# Patient Record
Sex: FEMALE | Race: WHITE | NOT HISPANIC OR LATINO | Employment: OTHER | URBAN - METROPOLITAN AREA
[De-identification: names, ages, dates, MRNs, and addresses within clinical notes are randomized per-mention and may not be internally consistent; named-entity substitution may affect disease eponyms.]

---

## 2017-07-07 ENCOUNTER — ALLSCRIPTS OFFICE VISIT (OUTPATIENT)
Dept: OTHER | Facility: OTHER | Age: 57
End: 2017-07-07

## 2017-07-07 LAB
BILIRUB UR QL STRIP: NORMAL
CLARITY UR: NORMAL
COLOR UR: YELLOW
GLUCOSE (HISTORICAL): NORMAL
HGB UR QL STRIP.AUTO: NORMAL
KETONES UR STRIP-MCNC: NORMAL MG/DL
LEUKOCYTE ESTERASE UR QL STRIP: NORMAL
NITRITE UR QL STRIP: NORMAL
PH UR STRIP.AUTO: 6.5 [PH]
PROT UR STRIP-MCNC: NORMAL MG/DL
SP GR UR STRIP.AUTO: 1
UROBILINOGEN UR QL STRIP.AUTO: 0.2

## 2017-11-09 ENCOUNTER — TRANSCRIBE ORDERS (OUTPATIENT)
Dept: ADMINISTRATIVE | Facility: HOSPITAL | Age: 57
End: 2017-11-09

## 2017-11-09 DIAGNOSIS — Z12.39 SCREENING BREAST EXAMINATION: Primary | ICD-10-CM

## 2017-12-01 ENCOUNTER — GENERIC CONVERSION - ENCOUNTER (OUTPATIENT)
Dept: OTHER | Facility: OTHER | Age: 57
End: 2017-12-01

## 2017-12-01 ENCOUNTER — HOSPITAL ENCOUNTER (OUTPATIENT)
Dept: RADIOLOGY | Age: 57
Discharge: HOME | End: 2017-12-01
Payer: COMMERCIAL

## 2017-12-01 DIAGNOSIS — Z12.39 SCREENING BREAST EXAMINATION: ICD-10-CM

## 2017-12-01 PROCEDURE — G0202 SCR MAMMO BI INCL CAD: HCPCS

## 2018-01-11 NOTE — RESULT NOTES
Verified Results  (1) LIPID PANEL, FASTING [id number] 57:50KC Chalo Kevin Order Number: [id number]  TW Order Number: [id number]     Test Name Result Flag Reference   CHOLESTEROL 215 mg/dL H    HDL,DIRECT 59 mg/dL  40-60   Specimen collection should occur prior to Metamizole administration due to the potential for falsely depressed results  LDL CHOLESTEROL CALCULATED 146 mg/dL H 0-100   Triglyceride:         Normal              <150 mg/dl       Borderline High    150-199 mg/dl       High               200-499 mg/dl       Very High          >499 mg/dl  Cholesterol:         Desirable        <200 mg/dl      Borderline High  200-239 mg/dl      High             >239 mg/dl  HDL Cholesterol:        High    >59 mg/dL      Low     <41 mg/dL  LDL CALCULATED:    This screening LDL is a calculated result  It does not have the accuracy of the Direct Measured LDL in the monitoring of patients with hyperlipidemia and/or statin therapy  Direct Measure LDL (CIP652) must be ordered separately in these patients  TRIGLYCERIDES 49 mg/dL  <=150   Specimen collection should occur prior to N-Acetylcysteine or Metamizole administration due to the potential for falsely depressed results  Discussion/Summary   Blood work shows cholesterol mildly elevated at 215  Mail low-cholesterol diet sheet to follow more closely

## 2018-01-18 NOTE — PROGRESS NOTES
Assessment    1  Encounter for preventive health examination (V70 0) (Z00 00)    Plan  Health Maintenance    · Urine Dip Non-Automated- POC; Status:Complete;   Done: [id number] 02:36PM    Discussion/Summary    Well adult  Patient appears to be in stable health  She was sent for blood work including hemoglobin A1c, lipid panel, CMP, CBC, and TSH with a laboratory voucher at [facility]  Chief Complaint  Pt is here for a physical  All meds/allergies reviewed with pt  History of Present Illness  HPI: Patient denies any recent illness  Her last gynecology exam was apparently 2 years ago  She is a nonsmoker  She does obtain a regular form of exercise  Her last colonoscopy was 5 years ago  Review of Systems    Constitutional: No fever, no chills, feels well, no tiredness, no recent weight gain or weight loss  Eyes: No complaints of eye pain, no red eyes, no eyesight problems, no discharge, no dry eyes, no itching of eyes  ENT: no complaints of earache, no loss of hearing, no nose bleeds, no nasal discharge, no sore throat, no hoarseness  Cardiovascular: No complaints of slow heart rate, no fast heart rate, no chest pain, no palpitations, no leg claudication, no lower extremity edema  Respiratory: No complaints of shortness of breath, no wheezing, no cough, no SOB on exertion, no orthopnea, no PND  Gastrointestinal: No complaints of abdominal pain, no constipation, no nausea or vomiting, no diarrhea, no bloody stools  Genitourinary: No complaints of dysuria, no incontinence, no pelvic pain, no dysmenorrhea, no vaginal discharge or bleeding  Musculoskeletal: No complaints of arthralgias, no myalgias, no joint swelling or stiffness, no limb pain or swelling  Integumentary: No complaints of skin rash or lesions, no itching, no skin wounds, no breast pain or lump     Neurological: No complaints of headache, no confusion, no convulsions, no numbness, no dizziness or fainting, no tingling, no limb weakness, no difficulty walking  Active Problems    1  Backache (724 5) (M54 9)   2  Hyperlipidemia (272 4) (E78 5)    Past Medical History    · History of Acute Gangrenous Pharyngitis (462)   · History of Encounter for screening mammogram for malignant neoplasm of breast  (V76 12) (Z12 31)   · History of Reported Pap Smear    Social History    · Marital History - Currently     Allergies    1  No Known Drug Allergies    Vitals   Recorded: [id number] 02:46PM Recorded: 03NVF5467 02:34PM   Temperature  97 1 F   Heart Rate  70   Systolic 124 032   Diastolic 60 72   Height  5 ft 8 in   Weight  147 lb    BMI Calculated  22 35   BSA Calculated  1 79     Physical Exam    Constitutional   General appearance: No acute distress, well appearing and well nourished  Ears, Nose, Mouth, and Throat   External inspection of ears and nose: Normal     Otoscopic examination: Tympanic membranes translucent with normal light reflex  Canals patent without erythema  Hearing: Normal     Oropharynx: Normal with no erythema, edema, exudate or lesions  Pulmonary   Respiratory effort: No increased work of breathing or signs of respiratory distress  Auscultation of lungs: Clear to auscultation  Cardiovascular   Auscultation of heart: Normal rate and rhythm, normal S1 and S2, no murmurs  Carotid pulses: 2+ bilaterally  Examination of extremities for edema and/or varicosities: Normal     Abdomen   Abdomen: Non-tender, no masses  Liver and spleen: No hepatomegaly or splenomegaly  Lymphatic   Palpation of lymph nodes in neck: No lymphadenopathy  Musculoskeletal   Gait and station: Normal     Skin   Skin and subcutaneous tissue: Normal without rashes or lesions  Psychiatric   Judgment and insight: Normal     Orientation to person, place, and time: Normal     Recent and remote memory: Intact      Mood and affect: Normal        Results/Data  PHQ-2 Adult Depression Screening [id number] 02:37PM User, [facility]     Test Name Result Flag Reference   PHQ-2 Adult Depression Score 0     Over the last two weeks, how often have you been bothered by any of the following problems?   Little interest or pleasure in doing things: Not at all - 0  Feeling down, depressed, or hopeless: Not at all - 0   PHQ-2 Adult Depression Screening Negative       Urine Dip Non-Automated- POC [id number] 73:15TA Dion [place]     Test Name Result Flag Reference   Color Yellow     Clarity Transparent     Leukocytes -     Nitrite -     Blood -     Bilirubin -     Urobilinogen 0 2     Protein -     Ph 6 5     Specific Gravity 1 005     Ketone -     Glucose -         Signatures   Electronically signed by : DONTAE Perdue ; Jul 7 7821  5:04PM EST                       (Author)

## 2018-01-22 VITALS
HEIGHT: 68 IN | DIASTOLIC BLOOD PRESSURE: 60 MMHG | SYSTOLIC BLOOD PRESSURE: 110 MMHG | HEART RATE: 70 BPM | TEMPERATURE: 97.1 F | BODY MASS INDEX: 22.28 KG/M2 | WEIGHT: 147 LBS

## 2018-12-06 ENCOUNTER — HOSPITAL ENCOUNTER (OUTPATIENT)
Dept: RADIOLOGY | Age: 58
Discharge: HOME | End: 2018-12-06
Payer: COMMERCIAL

## 2018-12-06 VITALS — WEIGHT: 145 LBS | BODY MASS INDEX: 21.98 KG/M2 | HEIGHT: 68 IN

## 2018-12-06 DIAGNOSIS — Z12.31 ENCOUNTER FOR SCREENING MAMMOGRAM FOR MALIGNANT NEOPLASM OF BREAST: ICD-10-CM

## 2018-12-06 PROCEDURE — 77067 SCR MAMMO BI INCL CAD: CPT

## 2020-03-03 ENCOUNTER — OFFICE VISIT (OUTPATIENT)
Dept: FAMILY MEDICINE CLINIC | Facility: CLINIC | Age: 60
End: 2020-03-03

## 2020-03-03 VITALS
HEART RATE: 74 BPM | HEIGHT: 68 IN | TEMPERATURE: 99.1 F | BODY MASS INDEX: 22.97 KG/M2 | RESPIRATION RATE: 15 BRPM | WEIGHT: 151.6 LBS | SYSTOLIC BLOOD PRESSURE: 94 MMHG | DIASTOLIC BLOOD PRESSURE: 70 MMHG | OXYGEN SATURATION: 94 %

## 2020-03-03 DIAGNOSIS — E78.5 HYPERLIPIDEMIA, UNSPECIFIED HYPERLIPIDEMIA TYPE: ICD-10-CM

## 2020-03-03 DIAGNOSIS — Z00.00 ANNUAL PHYSICAL EXAM: Primary | ICD-10-CM

## 2020-03-03 LAB
SL AMB  POCT GLUCOSE, UA: NORMAL
SL AMB LEUKOCYTE ESTERASE,UA: NORMAL
SL AMB POCT BILIRUBIN,UA: NORMAL
SL AMB POCT BLOOD,UA: NORMAL
SL AMB POCT CLARITY,UA: CLEAR
SL AMB POCT COLOR,UA: YELLOW
SL AMB POCT KETONES,UA: NORMAL
SL AMB POCT NITRITE,UA: NORMAL
SL AMB POCT PH,UA: 7
SL AMB POCT SPECIFIC GRAVITY,UA: 1.01
SL AMB POCT URINE PROTEIN: NORMAL
SL AMB POCT UROBILINOGEN: 0.2

## 2020-03-03 PROCEDURE — 99396 PREV VISIT EST AGE 40-64: CPT | Performed by: FAMILY MEDICINE

## 2020-03-03 PROCEDURE — 81002 URINALYSIS NONAUTO W/O SCOPE: CPT | Performed by: FAMILY MEDICINE

## 2020-03-03 RX ORDER — IBUPROFEN 600 MG/1
600 TABLET ORAL EVERY 6 HOURS PRN
COMMUNITY
End: 2022-02-25

## 2020-03-03 NOTE — ASSESSMENT & PLAN NOTE
Well adult  Patient appears to be overall stable health  She will obtain blood work as ordered for further evaluation  She is up-to-date with colon cancer screening as well as breast cancer and uterine cancer screening

## 2020-03-03 NOTE — PROGRESS NOTES
FAMILY PRACTICE OFFICE VISIT       NAME: Minyd Main  AGE: 62 y o  SEX: female       : 1960        MRN: [id number]    DATE: 3/3/2020  TIME: 4:38 PM    Assessment and Plan     Problem List Items Addressed This Visit        Other    Annual physical exam - Primary     Well adult  Patient appears to be overall stable health  She will obtain blood work as ordered for further evaluation  She is up-to-date with colon cancer screening as well as breast cancer and uterine cancer screening  Relevant Orders    POCT urine dip (Completed)    Hyperlipidemia     Hyperlipidemia  Patient will check lipid panel blood work and continue with current over-the-counter medications  We will make further recommendations pending results of test                   Chief Complaint     Chief Complaint   Patient presents with    Annual Exam       History of Present Illness     Patient denies any recent illness  Patient will be retiring from her [address] this year  She does see a dermatologist on a yearly basis for routine screening  She does see gynecologist and have mammograms as ordered  Her last colonoscopy was   She does take over-the-counter product for hot and cold flashes at night due to postmenopausal symptoms  She also takes calcium with vitamin-D, red yeast rice, and Krill oil for history of elevated cholesterol  She is a nonsmoker      Review of Systems   Review of Systems   Constitutional: Negative  HENT: Negative  Eyes: Negative  Respiratory: Negative  Cardiovascular: Negative  Gastrointestinal: Negative  Endocrine: Positive for cold intolerance and heat intolerance  Post menopausal hot flashes at bedtime   Genitourinary: Negative  Musculoskeletal: Negative  Skin: Negative  Allergic/Immunologic: Negative  Neurological: Negative  Hematological: Negative  Psychiatric/Behavioral: Negative          Active Problem List     Patient Active Problem List   Diagnosis    Annual physical exam    Hyperlipidemia       Past Medical History:  No past medical history on file  Past Surgical History:  No past surgical history on file      Family History:  Family History   Problem Relation Age of Onset    Ovarian cancer Sister 27       Social History:  Social History     Socioeconomic History    Marital status: /Civil Union     Spouse name: Not on file    Number of children: Not on file    Years of education: Not on file    Highest education level: Not on file   Occupational History    Not on file   Social Needs    Financial resource strain: Not on file    Food insecurity:     Worry: Not on file     Inability: Not on file    Transportation needs:     Medical: Not on file     Non-medical: Not on file   Tobacco Use    Smoking status: Never Smoker    Smokeless tobacco: Never Used   Substance and Sexual Activity    Alcohol use: No    Drug use: No    Sexual activity: Not on file   Lifestyle    Physical activity:     Days per week: Not on file     Minutes per session: Not on file    Stress: Not on file   Relationships    Social connections:     Talks on phone: Not on file     Gets together: Not on file     Attends [employer] service: Not on file     Active member of club or organization: Not on file     Attends meetings of clubs or organizations: Not on file     Relationship status: Not on file    Intimate partner violence:     Fear of current or ex partner: Not on file     Emotionally abused: Not on file     Physically abused: Not on file     Forced sexual activity: Not on file   Other Topics Concern    Not on file   Social History Narrative    Not on file       Objective     Vitals:    03/03/20 1433   BP: 94/70   Pulse: 74   Resp: 15   Temp: 99 1 °F (37 3 °C)   SpO2: 94%     Wt Readings from Last 3 Encounters:   03/03/20 68 8 kg (151 lb 9 6 oz)   12/06/18 65 8 kg (145 lb)   02/27/18 64 3 kg (141 lb 12 8 oz)       Physical Exam   Constitutional: She is oriented to person, place, and time  She appears well-developed and well-nourished  HENT:   Right Ear: External ear normal    Left Ear: External ear normal    Nose: Nose normal    Mouth/Throat: Oropharynx is clear and moist    Tympanic membranes normal  Pharynx clear   Eyes: Pupils are equal, round, and reactive to light  Conjunctivae and EOM are normal    Neck: Normal range of motion  Neck supple  No thyromegaly present  Cardiovascular: Normal rate, regular rhythm and normal heart sounds  No murmur heard  Pulmonary/Chest: Effort normal and breath sounds normal  No respiratory distress  She has no wheezes  She has no rales  Abdominal: Soft  Bowel sounds are normal  She exhibits no distension and no mass  There is no tenderness  There is no rebound and no guarding  NO hepatospenomegaly   Musculoskeletal: Normal range of motion  She exhibits no edema, tenderness or deformity  Lymphadenopathy:     She has no cervical adenopathy  Neurological: She is alert and oriented to person, place, and time  Skin:   NO RASHES   Psychiatric: She has a normal mood and affect  Her behavior is normal  Judgment and thought content normal    Nursing note and vitals reviewed        Pertinent Laboratory/Diagnostic Studies:  No results found for: GLUCOSE, BUN, CREATININE, CALCIUM, NA, K, CO2, CL  No results found for: ALT, AST, GGT, ALKPHOS, BILITOT    No results found for: WBC, HGB, HCT, MCV, PLT    No results found for: TSH    No results found for: CHOL  Lab Results   Component Value Date    TRIG 49 05/06/2016     Lab Results   Component Value Date    HDL 59 05/06/2016     Lab Results   Component Value Date    LDLCALC 146 (H) 05/06/2016     No results found for: HGBA1C    Results for orders placed or performed in visit on 03/03/20   POCT urine dip   Result Value Ref Range    LEUKOCYTE ESTERASE,UA moderate     NITRITE,UA -     SL AMB POCT UROBILINOGEN 0 2     POCT URINE PROTEIN -      PH,UA 7 0     BLOOD,UA - SPECIFIC GRAVITY,UA 1 010     KETONES,UA -     BILIRUBIN,UA -     GLUCOSE, UA -      COLOR,UA yellow     CLARITY,UA clear        Orders Placed This Encounter   Procedures    POCT urine dip       ALLERGIES:  No Known Allergies    Current Medications     Current Outpatient Medications   Medication Sig Dispense Refill    Calcium Carbonate-Vitamin D3 600-400 MG-UNIT TABS Take      ibuprofen (MOTRIN) 600 mg tablet Take 600 mg by mouth every 6 (six) hours as needed      KRILL OIL PO Take 1,000 mg by mouth      Red Yeast Rice Extract (RED YEAST RICE PO) Take by mouth       No current facility-administered medications for this visit  Health Maintenance     Health Maintenance   Topic Date Due    Hepatitis C Screening  1960    DTaP,Tdap,and Td Vaccines (1 - Tdap) 09/26/1969    HIV Screening  09/26/1973    Annual Physical  09/26/1976    Influenza Vaccine  03/30/2020 (Originally 7/1/2019)    MAMMOGRAM  12/06/2020    Depression Screening PHQ  03/03/2021    BMI: Adult  03/03/2021    Cervical Cancer Screening  12/05/2022    CRC Screening: Colonoscopy  12/21/2022    Pneumococcal Vaccine: 65+ Years (1 of 2 - PCV13) 09/26/2023    Pneumococcal Vaccine: Pediatrics (0 to 5 Years) and At-Risk Patients (6 to 59 Years)  Aged Out    HIB Vaccine  Aged Out    Hepatitis B Vaccine  Aged Out    IPV Vaccine  Aged Out    Hepatitis A Vaccine  Aged Out    Meningococcal ACWY Vaccine  Aged Out    HPV Vaccine  Aged Out       There is no immunization history on file for this patient      Getachew Ulrich MD

## 2020-03-03 NOTE — ASSESSMENT & PLAN NOTE
Hyperlipidemia  Patient will check lipid panel blood work and continue with current over-the-counter medications    We will make further recommendations pending results of test

## 2020-08-21 ENCOUNTER — APPOINTMENT (EMERGENCY)
Dept: RADIOLOGY | Facility: HOSPITAL | Age: 60
End: 2020-08-21

## 2020-08-21 ENCOUNTER — HOSPITAL ENCOUNTER (EMERGENCY)
Facility: HOSPITAL | Age: 60
Discharge: HOME | End: 2020-08-21
Attending: EMERGENCY MEDICINE | Admitting: EMERGENCY MEDICINE

## 2020-08-21 VITALS
HEIGHT: 68 IN | DIASTOLIC BLOOD PRESSURE: 65 MMHG | OXYGEN SATURATION: 96 % | TEMPERATURE: 98.1 F | BODY MASS INDEX: 22.88 KG/M2 | HEART RATE: 77 BPM | WEIGHT: 151 LBS | SYSTOLIC BLOOD PRESSURE: 152 MMHG | RESPIRATION RATE: 18 BRPM

## 2020-08-21 DIAGNOSIS — R51.9 HEADACHE: Primary | ICD-10-CM

## 2020-08-21 DIAGNOSIS — R11.2 NAUSEA & VOMITING: ICD-10-CM

## 2020-08-21 PROCEDURE — 96375 TX/PRO/DX INJ NEW DRUG ADDON: CPT

## 2020-08-21 PROCEDURE — 96361 HYDRATE IV INFUSION ADD-ON: CPT

## 2020-08-21 PROCEDURE — 96374 THER/PROPH/DIAG INJ IV PUSH: CPT

## 2020-08-21 PROCEDURE — 99283 EMERGENCY DEPT VISIT LOW MDM: CPT

## 2020-08-21 PROCEDURE — 70450 CT HEAD/BRAIN W/O DYE: CPT

## 2020-08-21 PROCEDURE — G1004 CDSM NDSC: HCPCS

## 2020-08-21 PROCEDURE — 99284 EMERGENCY DEPT VISIT MOD MDM: CPT | Performed by: EMERGENCY MEDICINE

## 2020-08-21 RX ORDER — METOCLOPRAMIDE HYDROCHLORIDE 5 MG/ML
10 INJECTION INTRAMUSCULAR; INTRAVENOUS ONCE
Status: COMPLETED | OUTPATIENT
Start: 2020-08-21 | End: 2020-08-21

## 2020-08-21 RX ORDER — ONDANSETRON 2 MG/ML
4 INJECTION INTRAMUSCULAR; INTRAVENOUS ONCE
Status: COMPLETED | OUTPATIENT
Start: 2020-08-21 | End: 2020-08-21

## 2020-08-21 RX ORDER — ONDANSETRON 4 MG/1
4 TABLET, ORALLY DISINTEGRATING ORAL EVERY 6 HOURS PRN
Qty: 10 TABLET | Refills: 0 | Status: SHIPPED | OUTPATIENT
Start: 2020-08-21 | End: 2021-12-21

## 2020-08-21 RX ORDER — KETOROLAC TROMETHAMINE 30 MG/ML
15 INJECTION, SOLUTION INTRAMUSCULAR; INTRAVENOUS ONCE
Status: COMPLETED | OUTPATIENT
Start: 2020-08-21 | End: 2020-08-21

## 2020-08-21 RX ORDER — DIPHENHYDRAMINE HYDROCHLORIDE 50 MG/ML
25 INJECTION INTRAMUSCULAR; INTRAVENOUS ONCE
Status: COMPLETED | OUTPATIENT
Start: 2020-08-21 | End: 2020-08-21

## 2020-08-21 RX ADMIN — METOCLOPRAMIDE 10 MG: 5 INJECTION, SOLUTION INTRAMUSCULAR; INTRAVENOUS at 21:50

## 2020-08-21 RX ADMIN — DIPHENHYDRAMINE HYDROCHLORIDE 25 MG: 50 INJECTION, SOLUTION INTRAMUSCULAR; INTRAVENOUS at 21:50

## 2020-08-21 RX ADMIN — KETOROLAC TROMETHAMINE 15 MG: 30 INJECTION, SOLUTION INTRAMUSCULAR at 21:50

## 2020-08-21 RX ADMIN — SODIUM CHLORIDE 1000 ML: 0.9 INJECTION, SOLUTION INTRAVENOUS at 21:48

## 2020-08-21 RX ADMIN — ONDANSETRON 4 MG: 2 INJECTION INTRAMUSCULAR; INTRAVENOUS at 22:36

## 2020-08-22 NOTE — ED PROVIDER NOTES
History  Chief Complaint   Patient presents with    Headache     Patient states she has had a headache on and off for the past couple weeks  Denies any sensitivity to light/sound  Denies any visual changes  +N/V     Pt is a 61yo F who presents for 1 day of headache with associated nausea and vomiting  Patient reports that she began headache last night that caused difficulty with sleeping  Patient states that throughout the day today that it has gotten worse despite ibuprofen and rest   Patient describes the headache as a throbbing in the frontal region with some associated neck pain  Patient states that she put peppermint oil on her neck that has relieved her neck pain  Patient rates the headache as a 3-4/10 with mild photophobia and phonophobia  Patient states that she does not have a history of migraines but did have a similar headache approximately 2 weeks ago that self resolved  Patient attributes headache at that time to dehydration is a had been out in the sun  Patient states that she has had 2 bouts of nonbloody vomiting today,  once after eating food and once after taking medications  Patient denying nausea at this time  Patient denying any motor or sensory deficits as well as any visual disturbance  Patient denies any trauma recently or any head strike  Patient denies ear pain but stated that she has had trouble getting water out of her right ear  Patient has no medication changes recently  Prior to Admission Medications   Prescriptions Last Dose Informant Patient Reported? Taking? Calcium Carbonate-Vitamin D3 600-400 MG-UNIT TABS   Yes No   Sig: Take   KRILL OIL PO   Yes No   Sig: Take 1,000 mg by mouth   Red Yeast Rice Extract (RED YEAST RICE PO)   Yes No   Sig: Take by mouth   ibuprofen (MOTRIN) 600 mg tablet   Yes No   Sig: Take 600 mg by mouth every 6 (six) hours as needed      Facility-Administered Medications: None       History reviewed   No pertinent past medical history  History reviewed  No pertinent surgical history  Family History   Problem Relation Age of Onset    Ovarian cancer Sister 27     I have reviewed and agree with the history as documented  E-Cigarette/Vaping     E-Cigarette/Vaping Substances     Social History     Tobacco Use    Smoking status: Never Smoker    Smokeless tobacco: Never Used   Substance Use Topics    Alcohol use: No    Drug use: No        Review of Systems   Constitutional: Negative for activity change, chills, diaphoresis and fever  HENT: Negative for congestion, ear discharge, ear pain, sinus pressure, sinus pain and sore throat  Eyes: Positive for photophobia  Negative for pain, redness and visual disturbance  Respiratory: Negative for cough, chest tightness, shortness of breath and wheezing  Cardiovascular: Negative for chest pain, palpitations and leg swelling  Gastrointestinal: Positive for nausea and vomiting (non-bloody)  Negative for abdominal distention, abdominal pain and diarrhea  Genitourinary: Negative for dysuria, frequency and urgency  Musculoskeletal: Positive for neck pain (relieved with peppermint oil)  Negative for arthralgias, back pain, gait problem and neck stiffness  Skin: Negative for color change, pallor, rash and wound  Neurological: Positive for headaches (frontal)  Negative for dizziness, tremors, seizures, syncope, weakness, light-headedness and numbness  Psychiatric/Behavioral: Negative for agitation, behavioral problems and confusion  The patient is not nervous/anxious          Physical Exam  ED Triage Vitals [08/21/20 2111]   Temperature Pulse Respirations Blood Pressure SpO2   98 1 °F (36 7 °C) 77 18 152/65 96 %      Temp Source Heart Rate Source Patient Position - Orthostatic VS BP Location FiO2 (%)   Oral Monitor Lying Right arm --      Pain Score       4             Orthostatic Vital Signs  Vitals:    08/21/20 2111   BP: 152/65   Pulse: 77   Patient Position - Orthostatic VS: Lying       Physical Exam  Vitals signs reviewed  Constitutional:       General: She is not in acute distress  Appearance: Normal appearance  She is well-developed  She is not ill-appearing, toxic-appearing or diaphoretic  Comments: Pt is sitting up and does not appear uncomfortable  HENT:      Head: Normocephalic and atraumatic  Right Ear: Tympanic membrane, ear canal and external ear normal       Left Ear: Tympanic membrane, ear canal and external ear normal       Nose: Nose normal       Mouth/Throat:      Mouth: Mucous membranes are moist    Eyes:      Extraocular Movements: Extraocular movements intact  Conjunctiva/sclera: Conjunctivae normal       Pupils: Pupils are equal, round, and reactive to light  Neck:      Musculoskeletal: Normal range of motion and neck supple  Muscular tenderness (paraspinal; worse on L) present  No neck rigidity  Cardiovascular:      Rate and Rhythm: Normal rate and regular rhythm  Pulses: Normal pulses  Radial pulses are 2+ on the right side and 2+ on the left side  Dorsalis pedis pulses are 2+ on the right side and 2+ on the left side  Heart sounds: Normal heart sounds  No murmur  Pulmonary:      Effort: Pulmonary effort is normal  No respiratory distress  Breath sounds: Normal breath sounds  No wheezing  Chest:      Chest wall: No tenderness  Abdominal:      General: Bowel sounds are normal  There is no distension  Palpations: Abdomen is soft  There is no mass  Tenderness: There is no abdominal tenderness  Musculoskeletal: Normal range of motion  General: No tenderness  Right lower leg: No edema  Left lower leg: No edema  Lymphadenopathy:      Cervical: No cervical adenopathy  Skin:     General: Skin is warm and dry  Capillary Refill: Capillary refill takes less than 2 seconds  Coloration: Skin is not pale  Findings: No erythema or rash     Neurological: General: No focal deficit present  Mental Status: She is alert and oriented to person, place, and time  Mental status is at baseline  GCS: GCS eye subscore is 4  GCS verbal subscore is 5  GCS motor subscore is 6  Cranial Nerves: Cranial nerves are intact  No cranial nerve deficit  Sensory: Sensation is intact  No sensory deficit  Motor: Motor function is intact  No weakness  Coordination: Coordination normal    Psychiatric:         Mood and Affect: Mood normal          Behavior: Behavior normal          Thought Content: Thought content normal          Judgment: Judgment normal          ED Medications  Medications   ketorolac (TORADOL) injection 15 mg (15 mg Intravenous Given 8/21/20 2150)   metoclopramide (REGLAN) injection 10 mg (10 mg Intravenous Given 8/21/20 2150)   diphenhydrAMINE (BENADRYL) injection 25 mg (25 mg Intravenous Given 8/21/20 2150)   sodium chloride 0 9 % bolus 1,000 mL (0 mL Intravenous Stopped 8/21/20 2334)   ondansetron (ZOFRAN) injection 4 mg (4 mg Intravenous Given 8/21/20 2236)       Diagnostic Studies  Results Reviewed     None                 CT head without contrast   Final Result by Amber Curry MD (08/21 2155)      No acute intracranial abnormality  Workstation performed: [id number]               Procedures  Procedures      ED Course  ED Course as of Aug 21 2341   Fri Aug 21, 2020   2201 No intracranial abnormality  Will reassess pt after fluids and medication  Plan to discharge if symptomatically improved  CT head without contrast   2204 Pt began vomiting  Zofran ordered at this time  2219 Reassessed pt  States mild improvement of headache and now rates it 2-3/10  No nausea currently but describes feel "woozy"  Discussed CT result with pt  Will let pt finish fluids and will reassess  2313 Reassessed pt and states that headache has resolved and she is not nauseous  Pt reports only complaint as 'feeling tired'   Plan to discharge at this time and pt is agreeable to plan  US AUDIT      Most Recent Value   Initial Alcohol Screen: US AUDIT-C    1  How often do you have a drink containing alcohol?  0 Filed at: 08/21/2020 2108   2  How many drinks containing alcohol do you have on a typical day you are drinking? 0 Filed at: 08/21/2020 2108   3b  FEMALE Any Age, or MALE 65+: How often do you have 4 or more drinks on one occassion? 0 Filed at: 08/21/2020 2108   Audit-C Score  0 Filed at: 08/21/2020 2108                  ELIECER/DAST-10      Most Recent Value   How many times in the past year have you    Used an illegal drug or used a prescription medication for non-medical reasons? Never Filed at: 08/21/2020 2108                              MDM  Number of Diagnoses or Management Options  Headache:   Nausea & vomiting:   Diagnosis management comments: Pt is a 59yo F who presents with headache and associated nausea and vomiting  Exam pertinent for mild paraspinal tenderness of the neck  Differential diagnosis to include but not limited to tension headache, migraine, SAH, meningitis, tumor  Migraine cocktail will be administered including Reglan, Benadryl, and Toradol as well as 1 L fluids based on patient's history of vomiting  Based on patient's full range of motion of the neck along with tenderness that was relieved with peppermint well, meningitis seems less likely at this time and requires no further workup  CT scan showed no evidence of acute intracranial abnormality including bleed or tumor  Patient had vomiting after initial medications and Zofran was added to regimen  Patient states that nausea and headache both resolved following medications and fluids  Plan to discharge patient with follow-up to PCP  Patient already has appointment scheduled for Tuesday and was encouraged to keep that appointment  Patient instructed to return to the emergency department with new or worsening symptoms    Patient given a script for Zofran and instructed to take as needed for nausea and vomiting  Patient expressed understanding of discharge instructions, medication instructions, and return precautions  All questions were answered  Patient was discharged without incident  Amount and/or Complexity of Data Reviewed  Tests in the radiology section of CPT®: ordered and reviewed  Discussion of test results with the performing providers: yes          Disposition  Final diagnoses:   Headache   Nausea & vomiting     Time reflects when diagnosis was documented in both MDM as applicable and the Disposition within this note     Time User Action Codes Description Comment    8/21/2020 11:19 PM Caity Cages Add [R51] Headache     8/21/2020 11:19 PM Caity Cages Add [R11 2] Nausea & vomiting       ED Disposition     ED Disposition Condition Date/Time Comment    Discharge Stable Fri Aug 21, 2020 11:19 PM Julien Jordan discharge to home/self care  Follow-up Information     Follow up With Specialties Details Why Mayra Cabrera MD Family Medicine  Keep your appointment on Tuesday [address] [place] [address]  [phone]            Discharge Medication List as of 8/21/2020 11:21 PM      START taking these medications    Details   ondansetron (ZOFRAN-ODT) 4 mg disintegrating tablet Take 1 tablet (4 mg total) by mouth every 6 (six) hours as needed for nausea or vomiting, Starting Fri 8/21/2020, Normal         CONTINUE these medications which have NOT CHANGED    Details   Calcium Carbonate-Vitamin D3 600-400 MG-UNIT TABS Take, Historical Med      ibuprofen (MOTRIN) 600 mg tablet Take 600 mg by mouth every 6 (six) hours as needed, Historical Med      KRILL OIL PO Take 1,000 mg by mouth, Starting Mon 4/30/2012, Historical Med      Red Yeast Rice Extract (RED YEAST RICE PO) Take by mouth, Historical Med           No discharge procedures on file      PDMP Review     None           ED Provider  Attending physically available and evaluated Lauren Green I managed the patient along with the ED Attending      Electronically Signed by         Elmo Maldonado MD  08/21/20 9983

## 2020-08-22 NOTE — ED ATTENDING ATTESTATION
8/21/2020  INataliia MD, saw and evaluated the patient  I have discussed the patient with the resident/non-physician practitioner and agree with the resident's/non-physician practitioner's findings, Plan of Care, and MDM as documented in the resident's/non-physician practitioner's note, except where noted  All available labs and Radiology studies were reviewed  I was present for key portions of any procedure(s) performed by the resident/non-physician practitioner and I was immediately available to provide assistance  At this point I agree with the current assessment done in the Emergency Department  I have conducted an independent evaluation of this patient a history and physical is as follows:    ED Course         Critical Care Time  Procedures    63 yo female with dull throbbing headache started last night and today two episodes of vomiting  Pt with no relief with ibuprofen  No numbness, tingling, weakness  No trauma, no fall  Pt with a similar headache two weeks ago but no hx of migraines  Vss, afebrile, lungs cta, rrr, abdomen soft nontender, no neuro deficits  Tm clear, no sinus tenderness  Low suspicion for sah  Ct to rule out mass, pain meds for likely tension headache

## 2020-08-22 NOTE — DISCHARGE INSTRUCTIONS
Keep your pre-existing appointment with PCP for follow-up  Return to the ED with new or worsening symptoms  Take your new medication as needed for nausea and vomiting

## 2020-08-22 NOTE — ED NOTES
Dr Margi Mishra at the bedside for patient evaluation at this time     Sam Ricardo, RN  08/21/20 9243

## 2021-06-28 ENCOUNTER — TELEPHONE (OUTPATIENT)
Dept: FAMILY MEDICINE CLINIC | Facility: CLINIC | Age: 61
End: 2021-06-28

## 2021-06-28 NOTE — TELEPHONE ENCOUNTER
Spoke with patient and she has appointment on 7/26/21 and needs a script for labs    Please call to advise

## 2021-07-26 ENCOUNTER — OFFICE VISIT (OUTPATIENT)
Dept: FAMILY MEDICINE CLINIC | Facility: CLINIC | Age: 61
End: 2021-07-26

## 2021-07-26 VITALS
WEIGHT: 148.4 LBS | RESPIRATION RATE: 15 BRPM | HEIGHT: 68 IN | TEMPERATURE: 97.6 F | HEART RATE: 76 BPM | SYSTOLIC BLOOD PRESSURE: 114 MMHG | OXYGEN SATURATION: 98 % | BODY MASS INDEX: 22.49 KG/M2 | DIASTOLIC BLOOD PRESSURE: 60 MMHG

## 2021-07-26 DIAGNOSIS — Z00.00 ANNUAL PHYSICAL EXAM: Primary | ICD-10-CM

## 2021-07-26 DIAGNOSIS — E78.5 HYPERLIPIDEMIA, UNSPECIFIED HYPERLIPIDEMIA TYPE: ICD-10-CM

## 2021-07-26 PROCEDURE — 99396 PREV VISIT EST AGE 40-64: CPT | Performed by: FAMILY MEDICINE

## 2021-07-26 NOTE — PROGRESS NOTES
FAMILY PRACTICE OFFICE VISIT       NAME: Nhung Gurrola  AGE: 56 y o  SEX: female       : 1960        MRN: [id number]    DATE: 2021  TIME: 6:38 AM    Assessment and Plan     Problem List Items Addressed This Visit        Other    Annual physical exam - Primary      Well adult  Overall the patient appears to be in stable health  She will obtain blood work as ordered for further evaluation  We will make further recommendations pending results of test          Hyperlipidemia       Hyperlipidemia  Patient with history of elevated cholesterol despite patient having a good diet and regular form of exercise  If lipid panel does not improve patient was open to the idea of low-dose statin therapy                   Chief Complaint     Chief Complaint   Patient presents with    Annual Exam     [facility] mammo  phq        History of Present Illness      Patient the office for annual wellness exam    Patient denies any recent illness  She stays active with activities around her home  She denies any changes in weight  Her mammogram is up-to-date  Her last colonoscopy was   Her COVID vaccination is up-to-date      Review of Systems   Review of Systems   Constitutional: Negative  HENT: Negative  Eyes: Negative  Respiratory: Negative  Cardiovascular: Negative  Gastrointestinal: Negative  Genitourinary: Negative  Musculoskeletal: Negative  Skin: Negative  Neurological: Negative  Psychiatric/Behavioral: Negative  Active Problem List     Patient Active Problem List   Diagnosis    Annual physical exam    Hyperlipidemia       Past Medical History:  History reviewed  No pertinent past medical history  Past Surgical History:  History reviewed  No pertinent surgical history      Family History:  Family History   Problem Relation Age of Onset    Ovarian cancer Sister 27       Social History:  Social History     Socioeconomic History    Marital status: /Civil Union Spouse name: Not on file    Number of children: Not on file    Years of education: Not on file    Highest education level: Not on file   Occupational History    Not on file   Tobacco Use    Smoking status: Never Smoker    Smokeless tobacco: Never Used   Substance and Sexual Activity    Alcohol use: No    Drug use: No    Sexual activity: Not on file   Other Topics Concern    Not on file   Social History Narrative    Not on file     Social Determinants of Health     Financial Resource Strain:     Difficulty of Paying Living Expenses:    Food Insecurity:     Worried About Running Out of Food in the Last Year:     [address] in the Last Year:    Transportation Needs:     Lack of Transportation (Medical):  Lack of Transportation (Non-Medical):    Physical Activity:     Days of Exercise per Week:     Minutes of Exercise per Session:    Stress:     Feeling of Stress :    Social Connections:     Frequency of Communication with Friends and Family:     Frequency of Social Gatherings with Friends and Family:     Attends [employer] Services:     Active Member of Clubs or Organizations:     Attends Club or Organization Meetings:     Marital Status:    Intimate Partner Violence:     Fear of Current or Ex-Partner:     Emotionally Abused:     Physically Abused:     Sexually Abused:        Objective     Vitals:    07/26/21 1410   BP: 114/60   Pulse: 76   Resp: 15   Temp: 97 6 °F (36 4 °C)   SpO2: 98%     Wt Readings from Last 3 Encounters:   07/26/21 67 3 kg (148 lb 6 4 oz)   08/21/20 68 5 kg (151 lb)   03/03/20 68 8 kg (151 lb 9 6 oz)       Physical Exam  Constitutional:       General: She is not in acute distress  Appearance: Normal appearance  She is not ill-appearing  HENT:      Head: Normocephalic and atraumatic  Eyes:      General:         Right eye: No discharge  Left eye: No discharge  Extraocular Movements: Extraocular movements intact        Conjunctiva/sclera: Conjunctivae normal       Pupils: Pupils are equal, round, and reactive to light  Neck:      Vascular: No carotid bruit  Cardiovascular:      Rate and Rhythm: Normal rate and regular rhythm  Heart sounds: Normal heart sounds  No murmur heard  Pulmonary:      Effort: Pulmonary effort is normal       Breath sounds: Normal breath sounds  No wheezing, rhonchi or rales  Abdominal:      General: Abdomen is flat  Bowel sounds are normal  There is no distension  Palpations: Abdomen is soft  Tenderness: There is no abdominal tenderness  There is no guarding or rebound  Musculoskeletal:      Right lower leg: No edema  Left lower leg: No edema  Lymphadenopathy:      Cervical: No cervical adenopathy  Skin:     Findings: No rash  Neurological:      General: No focal deficit present  Mental Status: She is alert and oriented to person, place, and time  Cranial Nerves: No cranial nerve deficit  Psychiatric:         Mood and Affect: Mood normal          Behavior: Behavior normal          Thought Content:  Thought content normal          Judgment: Judgment normal          Pertinent Laboratory/Diagnostic Studies:  No results found for: GLUCOSE, BUN, CREATININE, CALCIUM, NA, K, CO2, CL  No results found for: ALT, AST, GGT, ALKPHOS, BILITOT    No results found for: WBC, HGB, HCT, MCV, PLT    No results found for: TSH    No results found for: CHOL  Lab Results   Component Value Date    TRIG 49 05/06/2016     Lab Results   Component Value Date    HDL 59 05/06/2016     Lab Results   Component Value Date    LDLCALC 146 (H) 05/06/2016     No results found for: HGBA1C    Results for orders placed or performed in visit on 03/03/20   POCT urine dip   Result Value Ref Range    LEUKOCYTE ESTERASE,UA moderate     NITRITE,UA -     SL AMB POCT UROBILINOGEN 0 2     POCT URINE PROTEIN -      PH,UA 7 0     BLOOD,UA -     SPECIFIC GRAVITY,UA 1 010     KETONES,UA -     BILIRUBIN,UA -     GLUCOSE, UA - COLOR,UA yellow     CLARITY,UA clear        No orders of the defined types were placed in this encounter  ALLERGIES:  No Known Allergies    Current Medications     Current Outpatient Medications   Medication Sig Dispense Refill    Calcium Carbonate-Vitamin D3 600-400 MG-UNIT TABS Take      ibuprofen (MOTRIN) 600 mg tablet Take 600 mg by mouth every 6 (six) hours as needed      KRILL OIL PO Take 1,000 mg by mouth      ondansetron (ZOFRAN-ODT) 4 mg disintegrating tablet Take 1 tablet (4 mg total) by mouth every 6 (six) hours as needed for nausea or vomiting 10 tablet 0    Red Yeast Rice Extract (RED YEAST RICE PO) Take by mouth       No current facility-administered medications for this visit  Health Maintenance     Health Maintenance   Topic Date Due    Hepatitis C Screening  Never done    HIV Screening  Never done    COVID-19 Vaccine (1) Never done    DTaP,Tdap,and Td Vaccines (1 - Tdap) Never done    Breast Cancer Screening: Mammogram  12/06/2020    Annual Physical  03/03/2021    Influenza Vaccine (1) 09/01/2021    Depression Screening PHQ  07/26/2022    BMI: Adult  07/26/2022    Cervical Cancer Screening  12/05/2022    Colorectal Cancer Screening  12/21/2022    Pneumococcal Vaccine: Pediatrics (0 to 5 Years) and At-Risk Patients (6 to 59 Years)  Aged Out    HIB Vaccine  Aged Out    Hepatitis B Vaccine  Aged Out    IPV Vaccine  Aged Out    Hepatitis A Vaccine  Aged Out    Meningococcal ACWY Vaccine  Aged Out    HPV Vaccine  Aged Out       There is no immunization history on file for this patient      Magali Rinne, MD

## 2021-07-27 NOTE — ASSESSMENT & PLAN NOTE
Well adult  Overall the patient appears to be in stable health  She will obtain blood work as ordered for further evaluation    We will make further recommendations pending results of test

## 2021-07-27 NOTE — ASSESSMENT & PLAN NOTE
Hyperlipidemia  Patient with history of elevated cholesterol despite patient having a good diet and regular form of exercise    If lipid panel does not improve patient was open to the idea of low-dose statin therapy

## 2021-11-02 ENCOUNTER — TELEPHONE (OUTPATIENT)
Dept: FAMILY MEDICINE CLINIC | Facility: CLINIC | Age: 61
End: 2021-11-02

## 2021-12-09 ENCOUNTER — APPOINTMENT (EMERGENCY)
Dept: CT IMAGING | Facility: HOSPITAL | Age: 61
End: 2021-12-09

## 2021-12-09 ENCOUNTER — HOSPITAL ENCOUNTER (EMERGENCY)
Facility: HOSPITAL | Age: 61
Discharge: HOME | End: 2021-12-09
Attending: EMERGENCY MEDICINE | Admitting: EMERGENCY MEDICINE

## 2021-12-09 VITALS
HEART RATE: 71 BPM | DIASTOLIC BLOOD PRESSURE: 70 MMHG | RESPIRATION RATE: 18 BRPM | SYSTOLIC BLOOD PRESSURE: 138 MMHG | TEMPERATURE: 98 F | OXYGEN SATURATION: 98 %

## 2021-12-09 DIAGNOSIS — N94.9 ADNEXAL CYST: ICD-10-CM

## 2021-12-09 DIAGNOSIS — S01.511A LIP LACERATION, INITIAL ENCOUNTER: ICD-10-CM
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
## 2021-12-09 DIAGNOSIS — R55 SYNCOPE: ICD-10-CM

## 2021-12-09 DIAGNOSIS — E04.1 THYROID NODULE: ICD-10-CM

## 2021-12-09 LAB
ALBUMIN SERPL BCP-MCNC: 3.5 G/DL (ref 3.5–5)
ALP SERPL-CCNC: 56 U/L (ref 46–116)
ALT SERPL W P-5'-P-CCNC: 30 U/L (ref 12–78)
ANION GAP SERPL CALCULATED.3IONS-SCNC: 8 MMOL/L (ref 4–13)
AST SERPL W P-5'-P-CCNC: 17 U/L (ref 5–45)
BACTERIA UR QL AUTO: NORMAL /HPF
BASOPHILS # BLD AUTO: 0.06 THOUSANDS/ΜL (ref 0–0.1)
BASOPHILS NFR BLD AUTO: 1 % (ref 0–1)
BILIRUB SERPL-MCNC: 0.29 MG/DL (ref 0.2–1)
BILIRUB UR QL STRIP: NEGATIVE
BUN SERPL-MCNC: 17 MG/DL (ref 5–25)
CALCIUM SERPL-MCNC: 8.5 MG/DL (ref 8.3–10.1)
CARDIAC TROPONIN I PNL SERPL HS: <2 NG/L
CHLORIDE SERPL-SCNC: 105 MMOL/L (ref 100–108)
CK MB SERPL-MCNC: 1.6 NG/ML (ref 0–5)
CK MB SERPL-MCNC: <1 % (ref 0–2.5)
CK SERPL-CCNC: 179 U/L (ref 26–192)
CLARITY UR: CLEAR
CO2 SERPL-SCNC: 29 MMOL/L (ref 21–32)
COLOR UR: ABNORMAL
CREAT SERPL-MCNC: 0.88 MG/DL (ref 0.6–1.3)
EOSINOPHIL # BLD AUTO: 0.21 THOUSAND/ΜL (ref 0–0.61)
EOSINOPHIL NFR BLD AUTO: 2 % (ref 0–6)
ERYTHROCYTE [DISTWIDTH] IN BLOOD BY AUTOMATED COUNT: 12 % (ref 11.6–15.1)
GFR SERPL CREATININE-BSD FRML MDRD: 70 ML/MIN/1.73SQ M
GLUCOSE SERPL-MCNC: 109 MG/DL (ref 65–140)
GLUCOSE UR STRIP-MCNC: NEGATIVE MG/DL
HCT VFR BLD AUTO: 41.9 % (ref 34.8–46.1)
HGB BLD-MCNC: 13.8 G/DL (ref 11.5–15.4)
HGB UR QL STRIP.AUTO: NEGATIVE
IMM GRANULOCYTES # BLD AUTO: 0.02 THOUSAND/UL (ref 0–0.2)
IMM GRANULOCYTES NFR BLD AUTO: 0 % (ref 0–2)
KETONES UR STRIP-MCNC: NEGATIVE MG/DL
LEUKOCYTE ESTERASE UR QL STRIP: ABNORMAL
LIPASE SERPL-CCNC: 168 U/L (ref 73–393)
LYMPHOCYTES # BLD AUTO: 4.61 THOUSANDS/ΜL (ref 0.6–4.47)
LYMPHOCYTES NFR BLD AUTO: 51 % (ref 14–44)
MAGNESIUM SERPL-MCNC: 1.9 MG/DL (ref 1.6–2.6)
MCH RBC QN AUTO: 31 PG (ref 26.8–34.3)
MCHC RBC AUTO-ENTMCNC: 32.9 G/DL (ref 31.4–37.4)
MCV RBC AUTO: 94 FL (ref 82–98)
MONOCYTES # BLD AUTO: 0.59 THOUSAND/ΜL (ref 0.17–1.22)
MONOCYTES NFR BLD AUTO: 7 % (ref 4–12)
NEUTROPHILS # BLD AUTO: 3.49 THOUSANDS/ΜL (ref 1.85–7.62)
NEUTS SEG NFR BLD AUTO: 39 % (ref 43–75)
NITRITE UR QL STRIP: NEGATIVE
NON-SQ EPI CELLS URNS QL MICRO: NORMAL /HPF
NRBC BLD AUTO-RTO: 0 /100 WBCS
PH UR STRIP.AUTO: 7 [PH]
PLATELET # BLD AUTO: 270 THOUSANDS/UL (ref 149–390)
PMV BLD AUTO: 9.9 FL (ref 8.9–12.7)
POTASSIUM SERPL-SCNC: 3.5 MMOL/L (ref 3.5–5.3)
PROT SERPL-MCNC: 6.6 G/DL (ref 6.4–8.2)
PROT UR STRIP-MCNC: NEGATIVE MG/DL
RBC # BLD AUTO: 4.45 MILLION/UL (ref 3.81–5.12)
RBC #/AREA URNS AUTO: NORMAL /HPF
SODIUM SERPL-SCNC: 142 MMOL/L (ref 136–145)
SP GR UR STRIP.AUTO: <=1.005 (ref 1–1.03)
UROBILINOGEN UR QL STRIP.AUTO: 0.2 E.U./DL
WBC # BLD AUTO: 8.98 THOUSAND/UL (ref 4.31–10.16)
WBC #/AREA URNS AUTO: NORMAL /HPF

## 2021-12-09 PROCEDURE — 85025 COMPLETE CBC W/AUTO DIFF WBC: CPT | Performed by: EMERGENCY MEDICINE

## 2021-12-09 PROCEDURE — G1004 CDSM NDSC: HCPCS

## 2021-12-09 PROCEDURE — 12011 RPR F/E/E/N/L/M 2.5 CM/<: CPT | Performed by: PHYSICIAN ASSISTANT

## 2021-12-09 PROCEDURE — 70450 CT HEAD/BRAIN W/O DYE: CPT

## 2021-12-09 PROCEDURE — 90715 TDAP VACCINE 7 YRS/> IM: CPT | Performed by: PHYSICIAN ASSISTANT

## 2021-12-09 PROCEDURE — 82550 ASSAY OF CK (CPK): CPT | Performed by: PHYSICIAN ASSISTANT

## 2021-12-09 PROCEDURE — 70486 CT MAXILLOFACIAL W/O DYE: CPT

## 2021-12-09 PROCEDURE — 83690 ASSAY OF LIPASE: CPT | Performed by: PHYSICIAN ASSISTANT

## 2021-12-09 PROCEDURE — 72125 CT NECK SPINE W/O DYE: CPT

## 2021-12-09 PROCEDURE — 74177 CT ABD & PELVIS W/CONTRAST: CPT

## 2021-12-09 PROCEDURE — 93005 ELECTROCARDIOGRAM TRACING: CPT

## 2021-12-09 PROCEDURE — 80053 COMPREHEN METABOLIC PANEL: CPT | Performed by: EMERGENCY MEDICINE

## 2021-12-09 PROCEDURE — 83735 ASSAY OF MAGNESIUM: CPT | Performed by: PHYSICIAN ASSISTANT

## 2021-12-09 PROCEDURE — 82553 CREATINE MB FRACTION: CPT | Performed by: PHYSICIAN ASSISTANT

## 2021-12-09 PROCEDURE — 90471 IMMUNIZATION ADMIN: CPT

## 2021-12-09 PROCEDURE — 81001 URINALYSIS AUTO W/SCOPE: CPT | Performed by: PHYSICIAN ASSISTANT

## 2021-12-09 PROCEDURE — 71260 CT THORAX DX C+: CPT

## 2021-12-09 PROCEDURE — 99284 EMERGENCY DEPT VISIT MOD MDM: CPT

## 2021-12-09 PROCEDURE — 99285 EMERGENCY DEPT VISIT HI MDM: CPT | Performed by: PHYSICIAN ASSISTANT

## 2021-12-09 PROCEDURE — 84484 ASSAY OF TROPONIN QUANT: CPT | Performed by: PHYSICIAN ASSISTANT

## 2021-12-09 PROCEDURE — 36415 COLL VENOUS BLD VENIPUNCTURE: CPT

## 2021-12-09 RX ORDER — METHOCARBAMOL 500 MG/1
1000 TABLET, FILM COATED ORAL 3 TIMES DAILY
Qty: 30 TABLET | Refills: 0 | Status: SHIPPED | OUTPATIENT
Start: 2021-12-09 | End: 2021-12-21

## 2021-12-09 RX ORDER — AMOXICILLIN AND CLAVULANATE POTASSIUM 875; 125 MG/1; MG/1
1 TABLET, FILM COATED ORAL EVERY 12 HOURS
Qty: 20 TABLET | Refills: 0 | Status: SHIPPED | OUTPATIENT
Start: 2021-12-09 | End: 2021-12-19

## 2021-12-09 RX ORDER — LIDOCAINE HYDROCHLORIDE 10 MG/ML
5 INJECTION, SOLUTION EPIDURAL; INFILTRATION; INTRACAUDAL; PERINEURAL ONCE
Status: COMPLETED | OUTPATIENT
Start: 2021-12-09 | End: 2021-12-09

## 2021-12-09 RX ADMIN — IOHEXOL 100 ML: 350 INJECTION, SOLUTION INTRAVENOUS at 05:10

## 2021-12-09 RX ADMIN — TETANUS TOXOID, REDUCED DIPHTHERIA TOXOID AND ACELLULAR PERTUSSIS VACCINE, ADSORBED 0.5 ML: 5; 2.5; 8; 8; 2.5 SUSPENSION INTRAMUSCULAR at 07:28

## 2021-12-09 RX ADMIN — LIDOCAINE HYDROCHLORIDE 5 ML: 10 INJECTION, SOLUTION EPIDURAL; INFILTRATION; INTRACAUDAL at 04:50

## 2021-12-10 LAB
ATRIAL RATE: 68 BPM
P AXIS: 90 DEGREES
PR INTERVAL: 152 MS
QRS AXIS: 75 DEGREES
QRSD INTERVAL: 108 MS
QT INTERVAL: 392 MS
QTC INTERVAL: 408 MS
T WAVE AXIS: 38 DEGREES
VENTRICULAR RATE: 65 BPM

## 2021-12-10 PROCEDURE — 93010 ELECTROCARDIOGRAM REPORT: CPT | Performed by: INTERNAL MEDICINE

## 2021-12-21 ENCOUNTER — OFFICE VISIT (OUTPATIENT)
Dept: FAMILY MEDICINE CLINIC | Facility: CLINIC | Age: 61
End: 2021-12-21

## 2021-12-21 VITALS
OXYGEN SATURATION: 97 % | HEART RATE: 67 BPM | BODY MASS INDEX: 22.94 KG/M2 | HEIGHT: 68 IN | TEMPERATURE: 97.5 F | DIASTOLIC BLOOD PRESSURE: 80 MMHG | SYSTOLIC BLOOD PRESSURE: 110 MMHG | RESPIRATION RATE: 18 BRPM | WEIGHT: 151.38 LBS

## 2021-12-21 DIAGNOSIS — M53.3 SACROILIAC PAIN: ICD-10-CM

## 2021-12-21 DIAGNOSIS — N83.201 CYST OF RIGHT OVARY: ICD-10-CM

## 2021-12-21 DIAGNOSIS — E04.1 THYROID NODULE: Primary | ICD-10-CM

## 2021-12-21 PROCEDURE — 99213 OFFICE O/P EST LOW 20 MIN: CPT | Performed by: FAMILY MEDICINE

## 2022-01-21 ENCOUNTER — HOSPITAL ENCOUNTER (OUTPATIENT)
Dept: ULTRASOUND IMAGING | Facility: HOSPITAL | Age: 62
Discharge: HOME | End: 2022-01-21
Payer: COMMERCIAL

## 2022-01-21 DIAGNOSIS — E04.1 THYROID NODULE: ICD-10-CM

## 2022-01-21 PROCEDURE — 76536 US EXAM OF HEAD AND NECK: CPT

## 2022-01-25 ENCOUNTER — TELEPHONE (OUTPATIENT)
Dept: FAMILY MEDICINE CLINIC | Facility: CLINIC | Age: 62
End: 2022-01-25

## 2022-01-25 NOTE — TELEPHONE ENCOUNTER
----- Message from Ines Goldman MD sent at 0/81/[phone] 11:30 AM EST -----  Recent thyroid ultrasound showed multiple thyroid nodules that were very very small    Radiologist feels non are necessary for biopsy but would repeat thyroid ultrasound in 1 year for further evaluation

## 2022-01-28 ENCOUNTER — TELEPHONE (OUTPATIENT)
Dept: FAMILY MEDICINE CLINIC | Facility: CLINIC | Age: 62
End: 2022-01-28

## 2022-01-28 NOTE — TELEPHONE ENCOUNTER
----- Message from Jamie Barker MD sent at 0/57/4301  7:09 AM EST -----  Please speak to patient directly  I received a call from radiologist who states after going over her ultrasound once again they felt that 2 of the nodules on her right thyroid which were approximately 13 mm in diameter were close enough to criteria of 15 mm to require a needle biopsy  I would recommend [address] [place] [address] general surgeon Dr Lowry for evaluation  Please provide phone number    I will place order in epic

## 2022-01-28 NOTE — TELEPHONE ENCOUNTER
Spoke with patient  Made aware of results and provider's instructions  Patient verbalized understanding and was agreeable w/ plan  Tel # for Dr Arias Sorto provided

## 2022-02-02 ENCOUNTER — CONSULT (OUTPATIENT)
Dept: SURGERY | Facility: CLINIC | Age: 62
End: 2022-02-02

## 2022-02-02 VITALS — WEIGHT: 152.2 LBS | HEIGHT: 68 IN | BODY MASS INDEX: 23.07 KG/M2 | TEMPERATURE: 97.6 F

## 2022-02-02 DIAGNOSIS — E04.1 THYROID NODULE: Primary | ICD-10-CM

## 2022-02-02 PROCEDURE — 99243 OFF/OP CNSLTJ NEW/EST LOW 30: CPT | Performed by: SURGERY

## 2022-02-02 NOTE — ASSESSMENT & PLAN NOTE
I explained that to per radiology, these 2 nodules should be biopsied  I described what that would entail and what the results would be  I explained depending on the results would dictate further action  She understands and is agreeable  We will give her call once the biopsies are completed

## 2022-02-03 NOTE — NURSING NOTE
Call placed to patient to discuss upcoming appointment at [facility] radiology department and complete consultation with patient  Patient is having thyroid biopsy  utilizing US  guidance  Reviewed patient's allergies, no current anticoagulant medication present, also discussed the pre and post procedure expectations  Reminded patient of location and time expected for procedure, Patient expressed understanding by verbalizing and repeating instructions

## 2022-02-14 ENCOUNTER — HOSPITAL ENCOUNTER (OUTPATIENT)
Dept: RADIOLOGY | Facility: HOSPITAL | Age: 62
Discharge: HOME | End: 2022-02-14
Attending: SURGERY

## 2022-02-14 DIAGNOSIS — E04.1 THYROID NODULE: ICD-10-CM

## 2022-02-14 PROCEDURE — 88172 CYTP DX EVAL FNA 1ST EA SITE: CPT | Performed by: SPECIALIST

## 2022-02-14 PROCEDURE — 88173 CYTOPATH EVAL FNA REPORT: CPT | Performed by: SPECIALIST

## 2022-02-14 PROCEDURE — 10005 FNA BX W/US GDN 1ST LES: CPT

## 2022-02-14 PROCEDURE — 10006 FNA BX W/US GDN EA ADDL: CPT

## 2022-02-14 PROCEDURE — 88112 CYTOPATH CELL ENHANCE TECH: CPT | Performed by: SPECIALIST

## 2022-02-14 RX ORDER — LIDOCAINE HYDROCHLORIDE 10 MG/ML
4 INJECTION, SOLUTION EPIDURAL; INFILTRATION; INTRACAUDAL; PERINEURAL ONCE
Status: COMPLETED | OUTPATIENT
Start: 2022-02-14 | End: 2022-02-14

## 2022-02-14 RX ADMIN — LIDOCAINE HYDROCHLORIDE 4 ML: 10 INJECTION, SOLUTION EPIDURAL; INFILTRATION; INTRACAUDAL; PERINEURAL at 10:03

## 2022-02-17 ENCOUNTER — HOSPITAL ENCOUNTER (OUTPATIENT)
Dept: RADIOLOGY | Age: 62
Discharge: HOME | End: 2022-02-17

## 2022-02-17 VITALS — BODY MASS INDEX: 22.73 KG/M2 | WEIGHT: 150 LBS | HEIGHT: 68 IN

## 2022-02-17 DIAGNOSIS — Z12.31 ENCOUNTER FOR SCREENING MAMMOGRAM FOR MALIGNANT NEOPLASM OF BREAST: ICD-10-CM

## 2022-02-17 PROCEDURE — 77063 BREAST TOMOSYNTHESIS BI: CPT

## 2022-02-17 PROCEDURE — 77067 SCR MAMMO BI INCL CAD: CPT

## 2022-03-02 ENCOUNTER — OFFICE VISIT (OUTPATIENT)
Dept: FAMILY MEDICINE CLINIC | Facility: CLINIC | Age: 62
End: 2022-03-02

## 2022-03-02 VITALS
SYSTOLIC BLOOD PRESSURE: 120 MMHG | BODY MASS INDEX: 23.42 KG/M2 | DIASTOLIC BLOOD PRESSURE: 80 MMHG | RESPIRATION RATE: 16 BRPM | OXYGEN SATURATION: 95 % | WEIGHT: 154.5 LBS | HEIGHT: 68 IN | HEART RATE: 70 BPM | TEMPERATURE: 97.6 F

## 2022-03-02 DIAGNOSIS — Z01.818 PREOPERATIVE EVALUATION OF A MEDICAL CONDITION TO RULE OUT SURGICAL CONTRAINDICATIONS (TAR REQUIRED): Primary | ICD-10-CM

## 2022-03-02 DIAGNOSIS — R91.1 PULMONARY NODULE: ICD-10-CM

## 2022-03-02 PROCEDURE — 99215 OFFICE O/P EST HI 40 MIN: CPT | Performed by: FAMILY MEDICINE

## 2022-03-02 NOTE — ASSESSMENT & PLAN NOTE
Preoperative consultation  Overall the patient appears to be in stable health and is medically cleared to have upcoming gynecologic surgery for laparoscopic abdominal hysterectomy and bilateral oophorectomy scheduled for March 17, 2022

## 2022-03-02 NOTE — PROGRESS NOTES
FAMILY PRACTICE OFFICE VISIT       NAME: Haseeb Saldaña  AGE: 59 y o  SEX: female       : 1960        MRN: [id number]    DATE: 3/2/2022  TIME: 10:47 AM    Assessment and Plan     Problem List Items Addressed This Visit        Other    Preoperative evaluation of a medical condition to rule out surgical contraindications (TAR required) - Primary     Preoperative consultation  Overall the patient appears to be in stable health and is medically cleared to have upcoming gynecologic surgery for laparoscopic abdominal hysterectomy and bilateral oophorectomy scheduled for 2022  Pulmonary nodule     Pulmonary nodule  Patient with pulmonary nodule seen on recent chest x-ray  No specific aches were provided by radiologist therefore I will contact [facility] to obtain more information and recommendations on any follow-up studies  Patient is a nonsmoker and is asymptomatic regards to respiratory symptoms                   Chief Complaint     Chief Complaint   Patient presents with    Pre-op Exam     hysterectomy on 3//2022       History of Present Illness     Patient the office for preoperative consultation  Patient scheduled to have laparoscopic hysterectomy and bilateral oophorectomy after having cystic mass on right ovary discovered by Gynecology  Procedures to be performed on 2022 by Dr Sarah Herrera of [facility]  Patient denies any recent illness  I reviewed her chest x-ray and EKG  Review of Systems   Review of Systems   Constitutional: Negative  HENT: Negative  Eyes: Negative  Respiratory: Negative  Cardiovascular: Negative  Gastrointestinal: Negative  Endocrine: Negative  Genitourinary: Negative  Musculoskeletal: Negative  Skin: Negative  Allergic/Immunologic: Negative  Neurological: Negative  Hematological: Negative  Psychiatric/Behavioral: Negative          Active Problem List     Patient Active Problem List Diagnosis    Annual physical exam    Hyperlipidemia    Thyroid nodule    Sacroiliac pain    Cyst of right ovary    Preoperative evaluation of a medical condition to rule out surgical contraindications (TAR required)    Pulmonary nodule       Past Medical History:  History reviewed  No pertinent past medical history  Past Surgical History:  Past Surgical History:   Procedure Laterality Date    HYSTERECTOMY      US GUIDED THYROID BIOPSY  2/14/2022       Family History:  Family History   Problem Relation Age of Onset    Ovarian cancer Sister 27       Social History:  Social History     Socioeconomic History    Marital status: /Civil Union     Spouse name: Not on file    Number of children: Not on file    Years of education: Not on file    Highest education level: Not on file   Occupational History    Not on file   Tobacco Use    Smoking status: Never Smoker    Smokeless tobacco: Never Used   Vaping Use    Vaping Use: Never used   Substance and Sexual Activity    Alcohol use: No    Drug use: No    Sexual activity: Yes   Other Topics Concern    Not on file   Social History Narrative    Not on file     Social Determinants of Health     Financial Resource Strain: Not on file   Food Insecurity: Not on file   Transportation Needs: Not on file   Physical Activity: Not on file   Stress: Not on file   Social Connections: Not on file   Intimate Partner Violence: Not on file   Housing Stability: Not on file       Objective     Vitals:    03/02/22 0829   BP: 120/80   Pulse: 70   Resp: 16   Temp: 97 6 °F (36 4 °C)   SpO2: 95%     Wt Readings from Last 3 Encounters:   03/02/22 70 1 kg (154 lb 8 oz)   02/17/22 68 kg (150 lb)   02/02/22 69 kg (152 lb 3 2 oz)       Physical Exam  Constitutional:       General: She is not in acute distress  Appearance: Normal appearance  She is not ill-appearing  HENT:      Head: Normocephalic and atraumatic  Eyes:      General:         Right eye: No discharge  Left eye: No discharge  Extraocular Movements: Extraocular movements intact  Conjunctiva/sclera: Conjunctivae normal       Pupils: Pupils are equal, round, and reactive to light  Neck:      Vascular: No carotid bruit  Cardiovascular:      Rate and Rhythm: Normal rate and regular rhythm  Heart sounds: Normal heart sounds  No murmur heard  Pulmonary:      Effort: Pulmonary effort is normal       Breath sounds: Normal breath sounds  No wheezing, rhonchi or rales  Abdominal:      General: Abdomen is flat  Bowel sounds are normal  There is no distension  Palpations: Abdomen is soft  Tenderness: There is no abdominal tenderness  There is no guarding or rebound  Musculoskeletal:      Right lower leg: No edema  Left lower leg: No edema  Lymphadenopathy:      Cervical: No cervical adenopathy  Skin:     Findings: No rash  Neurological:      General: No focal deficit present  Mental Status: She is alert and oriented to person, place, and time  Cranial Nerves: No cranial nerve deficit  Psychiatric:         Mood and Affect: Mood normal          Behavior: Behavior normal          Thought Content:  Thought content normal          Judgment: Judgment normal          Pertinent Laboratory/Diagnostic Studies:  Lab Results   Component Value Date    BUN 17 12/09/2021    CREATININE 0 88 12/09/2021    CALCIUM 8 5 12/09/2021    K 3 5 12/09/2021    CO2 29 12/09/2021     12/09/2021     Lab Results   Component Value Date    ALT 30 12/09/2021    AST 17 12/09/2021    ALKPHOS 56 12/09/2021       Lab Results   Component Value Date    WBC 8 98 12/09/2021    HGB 13 8 12/09/2021    HCT 41 9 12/09/2021    MCV 94 12/09/2021     12/09/2021       No results found for: TSH    No results found for: CHOL  Lab Results   Component Value Date    TRIG 49 05/06/2016     Lab Results   Component Value Date    HDL 59 05/06/2016     Lab Results   Component Value Date    LDLCALC 146 (H) 05/06/2016     No results found for: HGBA1C    Results for orders placed or performed during the hospital encounter of 02/14/22   Fine Needle Aspiration   Result Value Ref Range    Case Report       Non-gynecologic Cytology                          Case: [id number]                                  Authorizing Provider:  Quique Garcia MD        Collected:           02/14/2022 1015              Ordering Location:     [address] [place] [address]      Received:            02/14/2022 [facility]                                                          Pathologist:           Talib Bang MD                                                     Specimens:   A) - Thyroid, Right, mid/lower                                                                      B) - Thyroid, Right, mid/lower                                                                      C) - Thyroid, Right, mid                                                                            D) - Thyroid, Right, mid                                                                            E) - Thyroid, Right, Cyst Drain                                                            Final Diagnosis       A & B  Thyroid, Right, mid/lower: (Thin-Prep and smears)  Benign (Tryon Category II) - See note  Moderately cellular specimen consists of of follicular cells in macro and microfollicles, Hurthle cells and cyst lining cells  Macrophage, and abundant colloid present  Satisfactory for evaluation  C & D  Thyroid, Right, mid: (Thin-Prep and smears)  Benign (Tryon Category II) - See note  Hurthle cells and follicular cells in micro and macrofollicular pattern  Abundant thick and thin colloid, macrophages, and mixed inflammatory cells  Satisfactory for evaluation  E  Thyroid, Right, Cyst Drain:  Benign (Tryon Category II) - See note  Macrophages, mixed inflammatory cells, and colloid   No follicular cells seen  Satisfactory for evaluation  Note: As reported in the [facility] for Reporting Thyroid Cytopathology*, the diagnostic category of "benign/negative for malignancy" carries a 0-3% risk of malignancy being found in subsequent resections (in the few studies of patients with benign FNA results that were followed long-term, a false negative rate of 0-3% was reported), with the usual management being clinical follow-up supplemented by ultrasonography (US) as indicated  Repeat FNA may be indicated for nodules showing significant growth or developing US abnormalities  Ultimately, clinical/imaging correlation for this patient is needed in arriving at the actual management plan  *The [facility] System for Reporting Thyroid Cytopathology, Tatianna Brasher Oh ), 2018 (2nd ed )       Note       - Intradepartmental consultation concurs with the diagnosis of part A&B  Intraoperative Consultation          B  Adequate  Dr Janet Parker, Interpretation performed at [address]         D  Dense colloid  Requested more  Dr Janet Parker, Interpretation performed at [address], [address] [address]        Gross Description          A   20ml, pink, clear, received in CytoLyt          B   6 slides received (3 alcohol, 3 diff quik)          C   20ml, light pink, clear, received in CytoLyt          D   12 slides received (6 alcohol, 6 diff quik)       E   20ml, dark brown, opaque, received in CytoLyt         Clinical Information       E04 1    Thyroid right mid/Lower:  Size: 2 9x2  1x2 5cm Margins: smooth Echogenicity: solid, cystic Microcalcs: absent Flow: intranodular, peripheral Size change: minimal Suspicion level: TR2 Hx of Hashimoto's Thyroiditis: no    Thyroid Right Mid:  Size: 1 3x 5x 7cm Margins: smooth Echogenicity: solid Microcalcs: N/A Flow: intranodular, peripheral Size change: minimal Suspicion level: TR4 Hx of Hashimoto's Thyroiditis: no        Additional Information       [employer]'s FDA approved ,  and ThinPrep Imaging Duo System are utilized with strict adherence to the 's instruction manual to prepare gynecologic and non-gynecologic cytology specimens for the production of ThinPrep slides as well as for gynecologic ThinPrep imaging  These processes have been validated by our laboratory and/or by the   These tests were developed and their performance characteristics determined by [facility] [address] [place] [address] or [facility]  They may not be cleared or approved by the [place]  Food and Drug Administration  The FDA has determined that such clearance or approval is not necessary  These tests are used for clinical purposes  They should not be regarded as investigational or for research  This laboratory has been approved by [place] [id number], designated as a high-complexity laboratory and is qualified to perform these tests  Interpretation performed at [address]          No orders of the defined types were placed in this encounter  ALLERGIES:  No Known Allergies    Current Medications     Current Outpatient Medications   Medication Sig Dispense Refill    Calcium Carbonate-Vitamin D3 600-400 MG-UNIT TABS Take      KRILL OIL PO Take 1,000 mg by mouth      Red Yeast Rice Extract (RED YEAST RICE PO) Take by mouth       No current facility-administered medications for this visit           Health Maintenance     Health Maintenance   Topic Date Due    Hepatitis C Screening  Never done    HIV Screening  Never done    Influenza Vaccine (1) Never done    COVID-19 Vaccine (2 - Booster for Jessenia series) 09/20/2021    Annual Physical  07/26/2022    Cervical Cancer Screening  12/05/2022    Colorectal Cancer Screening  12/21/2022    Depression Screening  03/02/2023    BMI: Adult  03/02/2023    Breast Cancer Screening: Mammogram  02/17/2024    DTaP,Tdap,and Td Vaccines (2 - Td or Tdap) 12/09/2031    Pneumococcal Vaccine: Pediatrics (0 to 5 Years) and At-Risk Patients (6 to 59 Years)  Aged Out    HIB Vaccine  Aged Out    Hepatitis B Vaccine  Aged Out    IPV Vaccine  Aged Out    Hepatitis A Vaccine  Aged Out    Meningococcal ACWY Vaccine  Aged Out    HPV Vaccine  Aged [employer] History   Administered Date(s) Administered    COVID-19 J&J (Venustech) vaccine 0 5 mL 07/26/2021    Tdap 91/51/4310       Maxine Loera MD

## 2022-03-02 NOTE — ASSESSMENT & PLAN NOTE
Pulmonary nodule  Patient with pulmonary nodule seen on recent chest x-ray  No specific aches were provided by radiologist therefore I will contact [facility] to obtain more information and recommendations on any follow-up studies    Patient is a nonsmoker and is asymptomatic regards to respiratory symptoms

## 2022-03-07 ENCOUNTER — TELEPHONE (OUTPATIENT)
Dept: FAMILY MEDICINE CLINIC | Facility: CLINIC | Age: 62
End: 2022-03-07

## 2022-03-07 NOTE — TELEPHONE ENCOUNTER
Please speak to patient directly  I spoke with [facility] radiologist regarding right upper lung nodule seen on x-ray  She stated that although nodule may be old scarring she did recommend obtaining a non emergent CT scan of her chest without contrast for further evaluation  She may consider waiting a few weeks if needed to obtain test    I will print out orders and patient may obtain testing through [facility] or [address] [place] [address]

## 2022-03-07 NOTE — TELEPHONE ENCOUNTER
Spoke with patient  Made aware of results and provider's instructions  Patient verbalized understanding and was agreeable w/ plan  Tel # for [address]  provided

## 2022-03-11 ENCOUNTER — HOSPITAL ENCOUNTER (OUTPATIENT)
Dept: CT IMAGING | Facility: HOSPITAL | Age: 62
Discharge: HOME | End: 2022-03-11

## 2022-03-11 DIAGNOSIS — R91.1 LUNG NODULE: ICD-10-CM

## 2022-03-11 PROCEDURE — 71250 CT THORAX DX C-: CPT

## 2022-03-11 PROCEDURE — G1004 CDSM NDSC: HCPCS

## 2022-03-16 ENCOUNTER — TELEPHONE (OUTPATIENT)
Dept: FAMILY MEDICINE CLINIC | Facility: CLINIC | Age: 62
End: 2022-03-16

## 2022-03-16 NOTE — TELEPHONE ENCOUNTER
----- Message from Sudarshan Yoon MD sent at 6/09/7747  7:09 AM EDT -----  No suspicious pulmonary nodule seen on recent CT scan of chest   No further follow-up recommended at this time

## 2022-04-13 NOTE — PROGRESS NOTES
HOSPITALIST DISCHARGE SUMMARY     Patient ID:    Bean Cagle  [id number]    87 year old  1/21/1935    Admit Date: 4/10/2022  Discharge Date:  4/13/2022     PRIMARY DIAGNOSIS:  Orthostatic hypotension with unwitnessed falls  Euvolemic hyponatremia  Nausea and vomiting  Chronic atrial fibrillation with pacemaker in place  Chronic kidney disease stage 3  Chronic systolic congestive Heart failure  Coronary artery disease  Hyperlipidemia    Consultants:   None    Procedures/Surgeries:  None     Pertinent studies/radiology exams: (refer to Radiologist and Cardiologist dictations for details)    CT CERVICAL SPINE WO CONTRAST   Final Result   IMPRESSION:      No acute fracture.      Multilevel degenerative spondylosis of the cervical spine as described.            CT Head Brain   Final Result   IMPRESSION: Chronic ischemic changes.      XR Chest AP or PA   Final Result   IMPRESSION: No acute cardiopulmonary disease. No interval change.                Hospital Course:  The patient is an 87-year-old male with history of chronic atrial fibrillation with pacemaker in place who presented with multiple unwitnessed falls.  At admission, he was found to be have orthostatic hypotension.  He was also hyponatremic and IV fluids were given with correction of his sodium level.  His carvedilol was titrated down and then discontinued.  Midodrine was started and his orthostatic hypotension seems to have resolved.  He is ambulating without lightheadedness or dizziness.  The nausea and vomiting that he was experiencing prior to admission has resolved and he is tolerating a full diet.  He has not required any lorazepam or quetiapine during admission and these medications have been discontinued.  He is otherwise stable for discharge back to [facility].    Brief exam before discharge:   VITAL SIGNS:   Visit Vitals  /78 (BP Location: LUE - Left upper extremity, Patient Position: Standing)   Pulse 67   Temp 97 °F (36.1 °C) (Temporal)  Office Visit - General Surgery  Allen Nicholson MRN: [id number]  Encounter: [id number]    Assessment and Plan  Problem List Items Addressed This Visit        Endocrine    Thyroid nodule - Primary     I explained that to per radiology, these 2 nodules should be biopsied  I described what that would entail and what the results would be  I explained depending on the results would dictate further action  She understands and is agreeable  We will give her call once the biopsies are completed  Relevant Orders    US guided thyroid biopsy with AFIRMA          Chief Complaint:  Allen Nicholson is a 59 y o  female who presents for Thyroid Problem (Consult thyroid nodule )    Subjective  22-year-old female who had fallen which required a CT scan which showed thyroid nodule on the right side  She went on to have thyroid ultrasound which showed 2 nodules the were deemed reasonable to biopsy  She has no thyroid symptoms, no radiation exposure  There was sister who had a benign thyroid mass removed  No voice changes    History reviewed  No pertinent past medical history  History reviewed  No pertinent surgical history  Family History   Problem Relation Age of Onset    Ovarian cancer Sister 27       Social History     Tobacco Use    Smoking status: Never Smoker    Smokeless tobacco: Never Used   Vaping Use    Vaping Use: Never used   Substance Use Topics    Alcohol use: No    Drug use: No        Medications  Current Outpatient Medications on File Prior to Visit   Medication Sig Dispense Refill    Calcium Carbonate-Vitamin D3 600-400 MG-UNIT TABS Take      KRILL OIL PO Take 1,000 mg by mouth      Red Yeast Rice Extract (RED YEAST RICE PO) Take by mouth      ibuprofen (MOTRIN) 600 mg tablet Take 600 mg by mouth every 6 (six) hours as needed       No current facility-administered medications on file prior to visit         Allergies  No Known Allergies    Review of Systems   Constitutional: Negative for   Resp 20   Ht 5' 9\" (1.753 m)   Wt 87.2 kg (192 lb 3.9 oz) Comment: bed rezeroed   SpO2 97%   BMI 28.39 kg/m²   ;  Body mass index is 28.39 kg/m².  GEN: No acute distress  Lung: CTA b/l  CV: RRR  Abdomen: Soft, non-tender, non-distended  Ext: No cyanosis, clubbing or edema    Discharge Medications:      Summary of your Discharge Medications      Take these Medications      Details   acetaminophen 500 MG tablet  Commonly known as: TYLENOL   Take 1 tablet by mouth every 6 hours as needed for Pain.     aluminum-magnesium hydroxide-simethicone 200-200-20 MG/5ML Suspension  Commonly known as: MAALOX   Take 30 mLs by mouth 4 times daily as needed (heartburn).     AMIODarone 200 MG tablet  Commonly known as: PACERONE   TAKE 1 TABLET BY MOUTH ONCE DAILY.  Comment: PLEASE RENEW RX. WE HAVE AN INSUFFICIENT QUANTITY TO COMPLETE THE NEXT CYCLE FILL. THANK YOU.     apixaBAN 2.5 MG Tab  Commonly known as: ELIQUIS   Take 1 tablet by mouth every 12 hours.     ARTIFICIAL TEAR SOLUTION OP   Place 1-2 drops into both eyes daily as needed (dry eyes).     aspirin 81 MG chewable tablet   Chew 1 tablet by mouth daily.     bacitracin-neomycin-polymyxin B 400-5-5000 ointment  Commonly known as: Triple Antibiotic   Apply 1 application topically 3 times daily as needed (minor cuts).     Benadryl Extra Strength 2-0.1 % cream   Generic drug: diphenhydrAMINE-zinc acetate  Apply 1 application topically 4 times daily as needed for Itching.     bisacodyl 10 MG suppository  Commonly known as: DULCOLAX   Place 10 mg rectally daily as needed for Constipation.     Desitin Ointment   Apply 1 application topically 4 times daily as needed.     levothyroxine 100 MCG tablet   Take 1 tablet by mouth daily.     midodrine 5 MG tablet  Commonly known as: PROAMATINE   Take 1 tablet by mouth 2 times daily.     MILK OF MAGNESIA PO   Take 30 mLs by mouth daily as needed (mild constipation).     mirtazapine 15 MG tablet  Commonly known as: REMERON   Take 7.5 mg by  chills and fever  HENT: Negative for trouble swallowing and voice change  Eyes: Negative for pain and visual disturbance  Respiratory: Negative for cough and shortness of breath  Cardiovascular: Negative for chest pain and leg swelling  Gastrointestinal: Negative for abdominal pain, nausea and vomiting  Endocrine: Negative for cold intolerance, heat intolerance, polydipsia, polyphagia and polyuria  Genitourinary: Negative for difficulty urinating and flank pain  Musculoskeletal: Negative for arthralgias and gait problem  Skin: Negative for rash and wound  Allergic/Immunologic: Negative for food allergies  Neurological: Positive for headaches  Negative for seizures, syncope and weakness  Hematological: Negative for adenopathy  Psychiatric/Behavioral: Negative for confusion  All other systems reviewed and are negative  Objective  Vitals:    02/02/22 0800   Temp: 97 6 °F (36 4 °C)       Physical Exam  Vitals and nursing note reviewed  Constitutional:       General: She is not in acute distress  Appearance: She is well-developed  She is not diaphoretic  HENT:      Head: Normocephalic and atraumatic  Right Ear: External ear normal       Left Ear: External ear normal    Eyes:      General: No scleral icterus  Conjunctiva/sclera: Conjunctivae normal    Neck:      Thyroid: No thyromegaly  Trachea: No tracheal deviation  Comments: Right lobe of thyroid is about 2 cm nodule no other thyroid masses appreciated  There are no nodes anywhere in the neck appreciated  Cardiovascular:      Rate and Rhythm: Normal rate and regular rhythm  Heart sounds: Normal heart sounds  No murmur heard  No friction rub  Pulmonary:      Effort: Pulmonary effort is normal  No respiratory distress  Breath sounds: Normal breath sounds  No wheezing or rales  Abdominal:      General: There is no distension  Palpations: Abdomen is soft  There is no mass        Tenderness: There is no abdominal tenderness  There is no guarding or rebound  Musculoskeletal:         General: Normal range of motion  Cervical back: Neck supple  Lymphadenopathy:      Cervical: No cervical adenopathy  Skin:     General: Skin is warm and dry  Neurological:      Mental Status: She is alert and oriented to person, place, and time  Psychiatric:         Behavior: Behavior normal          Thought Content:  Thought content normal          Judgment: Judgment normal  mouth at bedtime.     Nexcare Tegaderm 4\"x4-3/4\" Misc   APPLY TO SMALL SKIN TEARS AFTER WASHING WITH SOAP AND WATER. ALLOW TO DRY THEN APPLY TEGADERM AND CHANGE EVERY 3 DAYS AND AS NEEDED.     nystatin 833504 UNIT/GM powder  Commonly known as: MYCOSTATIN   Apply topically 2 times daily as needed (yeast).     polyethylene glycol 17 GM/SCOOP powder  Commonly known as: MIRALAX   Take 17 g by mouth daily as needed (Constipation).     * sertraline 100 MG tablet  Commonly known as: ZOLOFT   Take 100 mg by mouth daily. To take with sertraline 50mg = 150mg daily     * sertraline 50 MG tablet  Commonly known as: ZOLOFT   Take 50 mg by mouth daily. To take with sertraline 100mg = 150mg daily     sodium biphosphate 7-19 GM/118ML enema   Place 1 enema rectally daily as needed for Constipation.     sodium chloride 5 % ophthalmic solution  Commonly known as: ELIOT 128   Place 1 drop into both eyes daily.     terbinafine 1 % cream  Commonly known as: LamISIL AT   Apply 1 application topically 2 times daily. Until resolved plus one week after - apply to belly button         * This list has 2 medication(s) that are the same as other medications prescribed for you. Read the directions carefully, and ask your doctor or other care provider to review them with you.                Discharge Instructions:    · Discharge to: Discharged/transferred to Assisted Care Facility  · Activity: activity as tolerated  · Diet: regular diet  · Follow up with Gomez Engel MD in 1 week    Code status:  Full Resuscitation    Patient seen and examined day of discharge.  Follow up plan and discharge instructions discussed with patient/family. Total time spent on discharge process greater than 30 minutes.    Discharge summary above completed by myself.  Sherwin Goodman MD  Board Certified, Internal Medicine

## 2023-02-11 ENCOUNTER — NURSE TRIAGE (OUTPATIENT)
Dept: OTHER | Facility: OTHER | Age: 63
End: 2023-02-11

## 2023-02-11 NOTE — TELEPHONE ENCOUNTER
Reason for Disposition  • All other patients with painful urination (Exception: [1] EITHER frequency or urgency AND [2] has on-call doctor)    Additional Information  • [1] Discomfort (pain, burning or stinging) when passing urine AND [2] female    Answer Assessment - Initial Assessment Questions  1  SYMPTOM: "What's the main symptom you're concerned about?" (e g , frequency, incontinence)      Burning with urination  2  ONSET: "When did the  symptoms  start?"      overnight  3  PAIN: "Is there any pain?" If Yes, ask: "How bad is it?" (Scale: 1-10; mild, moderate, severe)      Denies  4   CAUSE: "What do you think is causing the symptoms?"      UTI  5  OTHER SYMPTOMS: "Do you have any other symptoms?" (e g , fever, flank pain, blood in urine, pain with urination)      Denies    Protocols used: URINATION PAIN - FEMALE-ADULT-, URINARY SYMPTOMS-ADULT-AH

## 2023-02-11 NOTE — TELEPHONE ENCOUNTER
Regarding: Possible UTI  ----- Message from Reyna Hollis sent at 2/11/2023 11:20 AM EST -----  "I believe I have a UTI which may have started last night  I went to the bathroom and felt some burning when I went to urinate   I am drinking unsweetened cranberry and don't want the issue to get worse "

## 2023-03-16 ENCOUNTER — TELEPHONE (OUTPATIENT)
Dept: FAMILY MEDICINE CLINIC | Facility: CLINIC | Age: 63
End: 2023-03-16

## 2023-03-16 DIAGNOSIS — E78.5 HYPERLIPIDEMIA, UNSPECIFIED HYPERLIPIDEMIA TYPE: ICD-10-CM

## 2023-03-16 DIAGNOSIS — E04.1 THYROID NODULE: Primary | ICD-10-CM

## 2023-03-16 DIAGNOSIS — Z00.00 PERIODIC HEALTH ASSESSMENT, GENERAL SCREENING, ADULT: ICD-10-CM

## 2023-03-16 NOTE — TELEPHONE ENCOUNTER
Patient called requesting labwork orders be placed prior to next Friday's physical  Please advise  Patient would like these printed and to be picked up, please call when done

## 2023-03-24 ENCOUNTER — OFFICE VISIT (OUTPATIENT)
Dept: FAMILY MEDICINE CLINIC | Facility: CLINIC | Age: 63
End: 2023-03-24

## 2023-03-24 VITALS
OXYGEN SATURATION: 96 % | HEIGHT: 68 IN | HEART RATE: 74 BPM | TEMPERATURE: 97.6 F | RESPIRATION RATE: 16 BRPM | SYSTOLIC BLOOD PRESSURE: 110 MMHG | WEIGHT: 152 LBS | DIASTOLIC BLOOD PRESSURE: 68 MMHG | BODY MASS INDEX: 23.04 KG/M2

## 2023-03-24 DIAGNOSIS — R51.9 NONINTRACTABLE HEADACHE, UNSPECIFIED CHRONICITY PATTERN, UNSPECIFIED HEADACHE TYPE: ICD-10-CM

## 2023-03-24 DIAGNOSIS — Z00.00 ANNUAL PHYSICAL EXAM: Primary | ICD-10-CM

## 2023-03-24 PROBLEM — Z01.818 PREOPERATIVE EVALUATION OF A MEDICAL CONDITION TO RULE OUT SURGICAL CONTRAINDICATIONS (TAR REQUIRED): Status: RESOLVED | Noted: 2022-03-02 | Resolved: 2023-03-24

## 2023-03-24 PROBLEM — Z90.710 HISTORY OF ROBOT-ASSISTED LAPAROSCOPIC HYSTERECTOMY: Status: ACTIVE | Noted: 2022-03-07

## 2023-03-24 NOTE — ASSESSMENT & PLAN NOTE
Annual wellness exam   Overall the patient appears to be in stable health  We will review blood test obtained this morning  We will make further recommendations pending results of test   Patient will call to schedule routine colonoscopy  Patient had mammogram last year that was within normal limits

## 2023-03-24 NOTE — PROGRESS NOTES
FAMILY PRACTICE OFFICE VISIT       NAME: Mary Costa  AGE: 57 y o  SEX: female       : 1960        MRN: [id number]    DATE: 3/24/2023  TIME: 2:06 PM    Assessment and Plan     Problem List Items Addressed This Visit        Other    Annual physical exam - Primary     Annual wellness exam   Overall the patient appears to be in stable health  We will review blood test obtained this morning  We will make further recommendations pending results of test   Patient will call to schedule routine colonoscopy  Patient had mammogram last year that was within normal limits  Headache     Headache  Patient will obtain routine optometry exam to rule out change in vision causing her headaches  Chief Complaint     Chief Complaint   Patient presents with   • Physical Exam     Yearly    • Care Gap Colonoscopy   • Gynecologic Exam   • Headache     Feel dehydrated often   • Eye Problem     Whites are yellowish per   History of Present Illness     Patient in the office for annual wellness exam   She denies any recent illness  She has been enjoying her traveling since retiring  She was recently in [place] 2 weeks ago  Last year patient had total abdominal hysterectomy and bilateral salpingo oophorectomy  Pathology showed no evidence of malignancy  Patient is scheduled at this time to have follow-up colonoscopy with Dr Brando Garrett  Patient had blood work yesterday that was ordered previously  Results are not available at this time  Patient has noted intermittent headaches that tend to occur if she does not stay well-hydrated throughout the previous day  Patient has 1 cup of coffee per day and primarily drinks water the rest of the day  She does wear reading glasses but is scheduled to have regular eye exam at [facility] in the near future      Review of Systems   Review of Systems   Constitutional: Negative  HENT: Negative  Eyes:        As per HPI   Respiratory: Negative  Cardiovascular: Negative  Gastrointestinal: Negative  Genitourinary: Negative  Musculoskeletal: Negative  Skin: Negative  Neurological: Positive for headaches  Psychiatric/Behavioral: Negative  Active Problem List     Patient Active Problem List   Diagnosis   • Annual physical exam   • Hyperlipidemia   • Thyroid nodule   • Sacroiliac pain   • Cyst of right ovary   • Pulmonary nodule   • History of robot-assisted laparoscopic hysterectomy   • Headache       Past Medical History:  History reviewed  No pertinent past medical history      Past Surgical History:  Past Surgical History:   Procedure Laterality Date   • HYSTERECTOMY     • US GUIDED THYROID BIOPSY  2/14/2022       Family History:  Family History   Problem Relation Age of Onset   • Ovarian cancer Sister 27       Social History:  Social History     Socioeconomic History   • Marital status: /Civil Union     Spouse name: Not on file   • Number of children: Not on file   • Years of education: Not on file   • Highest education level: Not on file   Occupational History   • Not on file   Tobacco Use   • Smoking status: Never   • Smokeless tobacco: Never   Vaping Use   • Vaping Use: Never used   Substance and Sexual Activity   • Alcohol use: No   • Drug use: No   • Sexual activity: Yes     Partners: Male   Other Topics Concern   • Not on file   Social History Narrative   • Not on file     Social Determinants of Health     Financial Resource Strain: Not on file   Food Insecurity: Not on file   Transportation Needs: Not on file   Physical Activity: Not on file   Stress: Not on file   Social Connections: Not on file   Intimate Partner Violence: Not on file   Housing Stability: Not on file       Objective     Vitals:    03/24/23 1353   BP: 110/68   Pulse:    Resp:    Temp:    SpO2:      Wt Readings from Last 3 Encounters:   03/24/23 68 9 kg (152 lb)   03/02/22 70 1 kg (154 lb 8 oz)   02/17/22 68 kg (150 lb)       Physical Exam  Constitutional:       General: She is not in acute distress  Appearance: Normal appearance  She is not ill-appearing  HENT:      Head: Normocephalic and atraumatic  Right Ear: Tympanic membrane, ear canal and external ear normal  There is no impacted cerumen  Left Ear: Tympanic membrane, ear canal and external ear normal  There is no impacted cerumen  Eyes:      General:         Right eye: No discharge  Left eye: No discharge  Extraocular Movements: Extraocular movements intact  Conjunctiva/sclera: Conjunctivae normal       Pupils: Pupils are equal, round, and reactive to light  Neck:      Vascular: No carotid bruit  Cardiovascular:      Rate and Rhythm: Normal rate and regular rhythm  Heart sounds: Normal heart sounds  No murmur heard  Pulmonary:      Effort: Pulmonary effort is normal       Breath sounds: Normal breath sounds  No wheezing, rhonchi or rales  Abdominal:      General: Abdomen is flat  Bowel sounds are normal  There is no distension  Palpations: Abdomen is soft  Tenderness: There is no abdominal tenderness  There is no guarding or rebound  Musculoskeletal:      Right lower leg: No edema  Left lower leg: No edema  Lymphadenopathy:      Cervical: No cervical adenopathy  Skin:     Findings: No rash  Neurological:      General: No focal deficit present  Mental Status: She is alert and oriented to person, place, and time  Cranial Nerves: No cranial nerve deficit  Psychiatric:         Mood and Affect: Mood normal          Behavior: Behavior normal          Thought Content:  Thought content normal          Judgment: Judgment normal          Pertinent Laboratory/Diagnostic Studies:  Lab Results   Component Value Date    BUN 17 12/09/2021    CREATININE 0 88 12/09/2021    CALCIUM 8 5 12/09/2021    K 3 5 12/09/2021    CO2 29 12/09/2021     12/09/2021     Lab Results   Component Value Date    ALT 30 12/09/2021    AST 17 12/09/2021    ALKPHOS 56 12/09/2021       Lab Results   Component Value Date    WBC 8 98 12/09/2021    HGB 13 8 12/09/2021    HCT 41 9 12/09/2021    MCV 94 12/09/2021     12/09/2021       No results found for: TSH    No results found for: CHOL  Lab Results   Component Value Date    TRIG 49 05/06/2016     Lab Results   Component Value Date    HDL 59 05/06/2016     Lab Results   Component Value Date    LDLCALC 146 (H) 05/06/2016     No results found for: HGBA1C    Results for orders placed or performed during the hospital encounter of 02/14/22   Fine Needle Aspiration   Result Value Ref Range    Case Report       Non-gynecologic Cytology                          Case: [id number]                                  Authorizing Provider:  José Miguel Celis MD        Collected:           02/14/2022 1015              Ordering Location:     [address] [place] [address]      Received:            02/14/2022 [facility]                                                          Pathologist:           Luis Enrique Morales MD                                                     Specimens:   A) - Thyroid, Right, mid/lower                                                                      B) - Thyroid, Right, mid/lower                                                                      C) - Thyroid, Right, mid                                                                            D) - Thyroid, Right, mid                                                                            E) - Thyroid, Right, Cyst Drain                                                            Final Diagnosis       A & B  Thyroid, Right, mid/lower: (Thin-Prep and smears)  Benign ([place] Category II) - See note  Moderately cellular specimen consists of of follicular cells in macro and microfollicles, Hurthle cells and cyst lining cells  Macrophage, and abundant colloid present      Satisfactory for evaluation  C & D  Thyroid, Right, mid: (Thin-Prep and smears)  Benign ([place] Category II) - See note  Hurthle cells and follicular cells in micro and macrofollicular pattern  Abundant thick and thin colloid, macrophages, and mixed inflammatory cells  Satisfactory for evaluation  E  Thyroid, Right, Cyst Drain:  Benign ([place] Category II) - See note  Macrophages, mixed inflammatory cells, and colloid  No follicular cells seen  Satisfactory for evaluation  Note: As reported in the [facility] for Reporting Thyroid Cytopathology*, the diagnostic category of "benign/negative for malignancy" carries a 0-3% risk of malignancy being found in subsequent resections (in the few studies of patients with benign FNA results that were followed long-term, a false negative rate of 0-3% was reported), with the usual management being clinical follow-up supplemented by ultrasonography (US) as indicated  Repeat FNA may be indicated for nodules showing significant growth or developing US abnormalities  Ultimately, clinical/imaging correlation for this patient is needed in arriving at the actual management plan  *The [place] System for Reporting Thyroid Cytopathology, Danielle Posada ), 2018 (2nd ed )       Note       - Intradepartmental consultation concurs with the diagnosis of part A&B  Intraoperative Consultation          B  Adequate  Dr Destiny Pearson, Interpretation performed at [facility], [address]         D  Dense colloid  Requested more    Dr Destiny Pearson, Interpretation performed at [facility], [address] [facility] [address]        Gross Description          A   20ml, pink, clear, received in CytoLyt          B   6 slides received (3 alcohol, 3 diff quik)          C   20ml, light pink, clear, received in CytoLyt          D   12 slides received (6 alcohol, 6 diff quik)       E   20ml, dark brown, opaque, received in CytoLyt         Clinical Information       E04 1    Thyroid right mid/Lower:  Size: 2 9x2  1x2 5cm Margins: smooth Echogenicity: solid, cystic Microcalcs: absent Flow: intranodular, peripheral Size change: minimal Suspicion level: TR2 Hx of Hashimoto's Thyroiditis: no    Thyroid Right Mid:  Size: 1 3x 5x 7cm Margins: smooth Echogenicity: solid Microcalcs: N/A Flow: intranodular, peripheral Size change: minimal Suspicion level: TR4 Hx of Hashimoto's Thyroiditis: no        Additional Information       [employer]'s FDA approved ,  and ThinPrep Imaging Duo System are utilized with strict adherence to the 's instruction manual to prepare gynecologic and non-gynecologic cytology specimens for the production of ThinPrep slides as well as for gynecologic ThinPrep imaging  These processes have been validated by our laboratory and/or by the   These tests were developed and their performance characteristics determined by [facility] or [address] [place] [address]  They may not be cleared or approved by the [place]  Food and Drug Administration  The FDA has determined that such clearance or approval is not necessary  These tests are used for clinical purposes  They should not be regarded as investigational or for research  This laboratory has been approved by [facility], designated as a high-complexity laboratory and is qualified to perform these tests  Interpretation performed at [address]          No orders of the defined types were placed in this encounter        ALLERGIES:  No Known Allergies    Current Medications     Current Outpatient Medications   Medication Sig Dispense Refill   • Calcium Carbonate-Vitamin D3 600-400 MG-UNIT TABS Take     • Coenzyme Q10 (CO Q 10 PO) Take 1 capsule by mouth Daily at 2am     • KRILL OIL PO Take 1,000 mg by mouth     • Multiple Vitamin (MULTIVITAMIN ADULT PO) Take 1 tablet by mouth daily     • Red Yeast Rice Extract (RED YEAST RICE PO) Take by mouth       No current facility-administered medications for this visit           Health Maintenance     Health Maintenance   Topic Date Due   • Hepatitis C Screening  Never done   • HIV Screening  Never done   • COVID-19 Vaccine (2 - Booster for Jessenia series) 09/20/2021   • Annual Physical  07/26/2022   • Cervical Cancer Screening  12/05/2022   • Colorectal Cancer Screening  12/21/2022   • Influenza Vaccine (1) 06/30/2023 (Originally 9/1/2022)   • Breast Cancer Screening: Mammogram  02/17/2024   • Depression Screening  03/24/2024   • BMI: Adult  03/24/2024   • DTaP,Tdap,and Td Vaccines (2 - Td or Tdap) 12/09/2031   • Pneumococcal Vaccine: Pediatrics (0 to 5 Years) and At-Risk Patients (6 to 59 Years)  Aged Out   • HIB Vaccine  Aged Out   • IPV Vaccine  Aged Out   • Hepatitis A Vaccine  Aged Out   • Meningococcal ACWY Vaccine  Aged Out   • HPV Vaccine  Aged [employer] History   Administered Date(s) Administered   • COVID-19 J&J (Jessenia) vaccine 0 5 mL 07/26/2021   • Tdap 09/31/4767       Charisse Garcia MD

## 2023-03-24 NOTE — ASSESSMENT & PLAN NOTE
Headache  Patient will obtain routine optometry exam to rule out change in vision causing her headaches

## 2023-03-27 ENCOUNTER — TELEPHONE (OUTPATIENT)
Dept: FAMILY MEDICINE CLINIC | Facility: CLINIC | Age: 63
End: 2023-03-27

## 2023-03-27 NOTE — TELEPHONE ENCOUNTER
----- Message from Brian Raímrez MD sent at 1/87/7742 12:49 PM EDT -----  All recent blood work for patient was stable including liver function test     Her 's blood test was also all stable

## 2023-05-01 DIAGNOSIS — Z12.12 SCREENING FOR COLORECTAL CANCER: Primary | ICD-10-CM

## 2023-05-01 DIAGNOSIS — Z12.11 SCREENING FOR COLORECTAL CANCER: Primary | ICD-10-CM

## 2023-07-19 ENCOUNTER — TELEPHONE (OUTPATIENT)
Dept: UROLOGY | Facility: AMBULATORY SURGERY CENTER | Age: 63
End: 2023-07-19

## 2023-07-19 NOTE — TELEPHONE ENCOUNTER
New Patient for Triage     New Patient    What is the reason for the patient’s appointment? NP- ER f/u for kidney stones. Seen in a hospital in [facility] while traveling back home from vacation. They gave her the disc with the CT images that she will bring to her appointment. Patient can be reached at [phone]    What office location does the patient prefer? [place] if possible    Does patient have Imaging/Lab Results? In Epic    Have patient records been requested? If No, are the records showing in Epic:   N/A    INSURANCE:   Do we accept the patient's insurance or is the patient Self-Pay? Self-Pay    HISTORY:   Has the patient had any previous Urologist(s)? No    Was the patient seen in the ED? Yes    Has the patient had any outside testing done? No    Does the patient have a personal history of cancer?  No

## 2023-07-21 NOTE — TELEPHONE ENCOUNTER
Outside imaging report shows mod-severe R sided hydro with a 2 mm R UVJ stone.      Please see if patient can be scheduled with Raúl ARAUZJEAN CLAUDE there is a held spot at 9 am

## 2023-07-21 NOTE — TELEPHONE ENCOUNTER
PT SCHEDULED AND MADE AWARE OF APPT AND LOCATION. LEANDRO: PLEASE MAKE UP A SELF PAY LETTER. THANK YOU.

## 2023-07-24 ENCOUNTER — OFFICE VISIT (OUTPATIENT)
Dept: UROLOGY | Facility: CLINIC | Age: 63
End: 2023-07-24

## 2023-07-24 VITALS
RESPIRATION RATE: 16 BRPM | DIASTOLIC BLOOD PRESSURE: 70 MMHG | SYSTOLIC BLOOD PRESSURE: 122 MMHG | OXYGEN SATURATION: 98 % | BODY MASS INDEX: 23.49 KG/M2 | WEIGHT: 155 LBS | HEIGHT: 68 IN | HEART RATE: 82 BPM

## 2023-07-24 DIAGNOSIS — N20.0 KIDNEY STONE: Primary | ICD-10-CM

## 2023-07-24 DIAGNOSIS — N20.1 URETERAL STONE: ICD-10-CM

## 2023-07-24 LAB — POST-VOID RESIDUAL VOLUME, ML POC: 174 ML

## 2023-07-24 PROCEDURE — 82360 CALCULUS ASSAY QUANT: CPT

## 2023-07-24 RX ORDER — TAMSULOSIN HYDROCHLORIDE 0.4 MG/1
0.4 CAPSULE ORAL DAILY
COMMUNITY
Start: 2023-07-15 | End: 2023-07-24

## 2023-07-24 RX ORDER — TAMSULOSIN HYDROCHLORIDE 0.4 MG/1
0.4 CAPSULE ORAL
Qty: 30 CAPSULE | Refills: 0 | Status: SHIPPED | OUTPATIENT
Start: 2023-07-24

## 2023-07-24 NOTE — PROGRESS NOTES
Office Visit- Urology  [facility] 1960 MRN: [id number]      Assessment/Discussion/Plan    57 y.o. female managed by     1. 2 mm right UVJ stone with resultant hydronephrosis   -CT report with moderate to severe hydronephrosis from a 2 mm right UVJ stone   -Passed very small stone fragment that she was able to collect in which we will send out for stone analysis  -Continue with straining all urine  -She is asymptomatic   -Continue with Flomax  -Because of the size of the stone fragment potential that there is still remaining fragment within the ureter. At this time we will treat as if patient still has obstructing stone and will continue with Flomax and obtain confirmatory imaging to confirm that stone is passed in 4 to 6-week timeframe  -Discussed option of ultrasound versus CT imaging and the benefits and risks of both but at this time due to concern for potential small ureteral stone component remaining will obtain CT scan for better evaluation of ureter.  -Because of 6 mm stone seen in the right kidney as well did offer elective surgery but patient wishes to proceed with observation with plan for yearly imaging after confirmation of passage of ureteral stone  -Follow-up in approximately 5 weeks or so for obtainment of CT scan    2.   Some sensation of incomplete bladder emptying  - mL today  -Elevated but not to the point of concern or intervention is warranted  -Continue to monitor with PVR at follow-up    Chief Complaint:   Alex Barrett is a 57 y.o. female presenting to the office for an initial evaluation regarding  Right ureteral stone        Subjective    -24-year-old female  -No significant past urologic history  -A few weeks ago patient was in [facility] and experienced severe right sided flank pain/abdominal pain  -[facility] [place] [facility] [place] and was found to have a 2 mm obstructing UVJ stone with resultant moderate to severe hydronephrosis  -She was discharged with course of Flomax  -She she has also been drinking plenty of fluids   -She states that this past Saturday she was experiencing symptoms potentially indicative of a urinary tract infection with urgency, frequency, dysuria.  -On Sunday she passed a stone fragment that she was able to  her straining  -Patient has some sensation of incomplete bladder emptying  -She denies any fever, chills, flank pain, or any episodes of gross hematuria      ROS:   Review of Systems   Constitutional: Negative. Negative for chills, fatigue and fever. HENT: Negative. Respiratory: Negative for shortness of breath. Cardiovascular: Negative for chest pain. Gastrointestinal: Negative. Negative for abdominal pain. Endocrine: Negative. Musculoskeletal: Negative. Skin: Negative. Neurological: Negative. Negative for dizziness and light-headedness. Hematological: Negative. Psychiatric/Behavioral: Negative. Past Medical History  History reviewed. No pertinent past medical history.     Past Surgical History  Past Surgical History:   Procedure Laterality Date   • HYSTERECTOMY     • US GUIDED THYROID BIOPSY  2/14/2022       Past Family History  Family History   Problem Relation Age of Onset   • Ovarian cancer Sister 27       Past Social history  Social History     Socioeconomic History   • Marital status: /Civil Union     Spouse name: Not on file   • Number of children: Not on file   • Years of education: Not on file   • Highest education level: Not on file   Occupational History   • Not on file   Tobacco Use   • Smoking status: Never   • Smokeless tobacco: Never   Vaping Use   • Vaping Use: Never used   Substance and Sexual Activity   • Alcohol use: No   • Drug use: No   • Sexual activity: Yes     Partners: Male   Other Topics Concern   • Not on file   Social History Narrative   • Not on file     Social Determinants of Health     Financial Resource Strain: Not on file   Food Insecurity: Not on file   Transportation Needs: Not on file   Physical Activity: Not on file   Stress: Not on file   Social Connections: Not on file   Intimate Partner Violence: Not on file   Housing Stability: Not on file       Current Medications  Current Outpatient Medications   Medication Sig Dispense Refill   • Calcium Carbonate-Vitamin D3 600-400 MG-UNIT TABS Take     • Coenzyme Q10 (CO Q 10 PO) Take 1 capsule by mouth Daily at 2am     • KRILL OIL PO Take 1,000 mg by mouth     • Multiple Vitamin (MULTIVITAMIN ADULT PO) Take 1 tablet by mouth daily     • Red Yeast Rice Extract (RED YEAST RICE PO) Take by mouth     • tamsulosin (FLOMAX) 0.4 mg Take 0.4 mg by mouth daily As directed       No current facility-administered medications for this visit. Allergies  No Known Allergies    OBJECTIVE    Vitals   Vitals:    07/24/23 0857   BP: 122/70   BP Location: Left arm   Patient Position: Sitting   Cuff Size: Large   Pulse: 82   Resp: 16   SpO2: 98%   Weight: 70.3 kg (155 lb)   Height: 5' 8" (1.727 m)       PVR:    Physical Exam  Constitutional:       General: She is not in acute distress. Appearance: Normal appearance. She is normal weight. She is not ill-appearing or toxic-appearing. HENT:      Head: Normocephalic and atraumatic. Eyes:      Conjunctiva/sclera: Conjunctivae normal.   Cardiovascular:      Rate and Rhythm: Normal rate. Pulmonary:      Effort: Pulmonary effort is normal. No respiratory distress. Abdominal:      Tenderness: There is no right CVA tenderness or left CVA tenderness. Musculoskeletal:         General: Normal range of motion. Cervical back: Normal range of motion and neck supple. Skin:     General: Skin is warm and dry. Neurological:      General: No focal deficit present. Mental Status: She is alert and oriented to person, place, and time. Cranial Nerves: No cranial nerve deficit.    Psychiatric:         Mood and Affect: Mood normal.         Behavior: Behavior normal.         Thought Content: Thought content normal.          Labs:     Lab Results   Component Value Date    CREATININE 0.88 12/09/2021      No results found for: "HGBA1C"  Lab Results   Component Value Date    CALCIUM 8.5 12/09/2021    K 3.5 12/09/2021    CO2 29 12/09/2021     12/09/2021    BUN 17 12/09/2021    CREATININE 0.88 12/09/2021       I have personally reviewed all pertinent lab results and reviewed with patient    Imaging       Servando Rudd PA-C  Date: 7/24/2023 Time: 9:07 AM  [facility]    This note was written using fluency dictation software. Please excuse any resulting minor grammatical errors.

## 2023-07-24 NOTE — PATIENT INSTRUCTIONS
Ways to Help Prevent Kidney Stone Prevention    Staying hydrated is key. When you are dehydrated, the particles that make kidney stones precipitate out of the urine and can chemically combine to form or grow established kidney stones. Try to drink enough fluid that your urine is a pale yellow-colorless. Water is the an ideal choice for fluids but other non-calorie containing beverages are ok as well. Try to avoid calorie sweetened beverages to avoid weight gain which has been found in studies to be an independent risk factor for stone formation    Try to limit sodium intake to 2300 mg a day. A low sodium can decrease urinary calcium levels. Increase fruit and vegetable intake, especially if you do not eat a lot of fruits and vegetables to begin with. Fruits and vegetables are rich in citrate and potassium which observational studies have found to lower risk of stone formation. Weight Loss may be helpful in preventing kidney stone recurrence since obesity and weight gain are risk factors for kidney stones. However, there are no established studies that find that weight loss reduces the risk of stone formation.

## 2023-07-27 ENCOUNTER — OFFICE VISIT (OUTPATIENT)
Dept: FAMILY MEDICINE CLINIC | Facility: CLINIC | Age: 63
End: 2023-07-27

## 2023-07-27 VITALS
SYSTOLIC BLOOD PRESSURE: 130 MMHG | OXYGEN SATURATION: 98 % | HEIGHT: 68 IN | BODY MASS INDEX: 22.94 KG/M2 | HEART RATE: 69 BPM | TEMPERATURE: 98 F | WEIGHT: 151.4 LBS | DIASTOLIC BLOOD PRESSURE: 80 MMHG | RESPIRATION RATE: 18 BRPM

## 2023-07-27 DIAGNOSIS — N20.0 NEPHROLITHIASIS: Primary | ICD-10-CM

## 2023-07-27 PROCEDURE — 99212 OFFICE O/P EST SF 10 MIN: CPT | Performed by: FAMILY MEDICINE

## 2023-07-27 NOTE — PROGRESS NOTES
FAMILY PRACTICE OFFICE VISIT       NAME: Kathleen Whitlock  AGE: 57 y.o. SEX: female       : 1960        MRN: [id number]    DATE: 2023  TIME: 3:22 PM    Assessment and Plan     Problem List Items Addressed This Visit        Genitourinary    Nephrolithiasis - Primary     Nephrolithiasis. Patient will continue with Flomax until she obtains her follow-up CT scan of abdomen and pelvis. She is aware to stay well-hydrated with water throughout the day to prevent recurrence of kidney stone. Patient currently is asymptomatic. She does have some hydrocodone tablets leftover should she experience pain once again. Chief Complaint     Chief Complaint   Patient presents with   • Nephrolithiasis     Follow up         History of Present Illness     Patient reports [id number] developed sudden onset of right CVA and abdominal discomfort in early 2023. She traveled from [facility] approximately 50 minutes into the [place] before requiring an ambulance to take her to a local hospital.  She was diagnosed with a kidney stone and was placed on Flomax and hydrocodone as needed for pain. Patient did see urologist on . She was recommended to have repeat CAT scan of abdomen and pelvis in 4 to 6 weeks to reevaluate for any new stones. Patient did pass a stone while straining her urine      Review of Systems   Review of Systems   Constitutional: Negative. HENT: Negative. Eyes: Negative. Respiratory: Negative. Cardiovascular: Negative. Gastrointestinal: Negative. Genitourinary: Negative. Musculoskeletal: Negative. Skin: Negative. Neurological: Negative. Psychiatric/Behavioral: Negative.         Active Problem List     Patient Active Problem List   Diagnosis   • Annual physical exam   • Hyperlipidemia   • Thyroid nodule   • Sacroiliac pain   • Cyst of right ovary   • Pulmonary nodule   • History of robot-assisted laparoscopic hysterectomy   • Headache   • Nephrolithiasis Past Medical History:  History reviewed. No pertinent past medical history. Past Surgical History:  Past Surgical History:   Procedure Laterality Date   • HYSTERECTOMY     • US GUIDED THYROID BIOPSY  2/14/2022       Family History:  Family History   Problem Relation Age of Onset   • Ovarian cancer Sister 27       Social History:  Social History     Socioeconomic History   • Marital status: /Civil Union     Spouse name: Not on file   • Number of children: Not on file   • Years of education: Not on file   • Highest education level: Not on file   Occupational History   • Not on file   Tobacco Use   • Smoking status: Never   • Smokeless tobacco: Never   Vaping Use   • Vaping Use: Never used   Substance and Sexual Activity   • Alcohol use: No   • Drug use: No   • Sexual activity: Yes     Partners: Male   Other Topics Concern   • Not on file   Social History Narrative   • Not on file     Social Determinants of Health     Financial Resource Strain: Not on file   Food Insecurity: Not on file   Transportation Needs: Not on file   Physical Activity: Not on file   Stress: Not on file   Social Connections: Not on file   Intimate Partner Violence: Not on file   Housing Stability: Not on file       Objective     Vitals:    07/27/23 1414   BP: 130/80   Pulse: 69   Resp: 18   Temp: 98 °F (36.7 °C)   SpO2: 98%     Wt Readings from Last 3 Encounters:   07/27/23 68.7 kg (151 lb 6.4 oz)   07/24/23 70.3 kg (155 lb)   03/24/23 68.9 kg (152 lb)       Physical Exam  Constitutional:       General: She is not in acute distress. Appearance: Normal appearance. She is not ill-appearing. HENT:      Head: Normocephalic and atraumatic. Eyes:      General:         Right eye: No discharge. Left eye: No discharge. Extraocular Movements: Extraocular movements intact. Conjunctiva/sclera: Conjunctivae normal.      Pupils: Pupils are equal, round, and reactive to light. Neck:      Vascular: No carotid bruit. Cardiovascular:      Rate and Rhythm: Normal rate and regular rhythm. Heart sounds: Normal heart sounds. No murmur heard. Pulmonary:      Effort: Pulmonary effort is normal.      Breath sounds: Normal breath sounds. No wheezing, rhonchi or rales. Abdominal:      General: Abdomen is flat. Bowel sounds are normal. There is no distension. Palpations: Abdomen is soft. Tenderness: There is no abdominal tenderness. There is no guarding or rebound. Musculoskeletal:      Right lower leg: No edema. Left lower leg: No edema. Lymphadenopathy:      Cervical: No cervical adenopathy. Skin:     Findings: No rash. Neurological:      General: No focal deficit present. Mental Status: She is alert and oriented to person, place, and time. Cranial Nerves: No cranial nerve deficit. Psychiatric:         Mood and Affect: Mood normal.         Behavior: Behavior normal.         Thought Content:  Thought content normal.         Judgment: Judgment normal.         Pertinent Laboratory/Diagnostic Studies:  Lab Results   Component Value Date    BUN 17 12/09/2021    CREATININE 0.88 12/09/2021    CALCIUM 8.5 12/09/2021    K 3.5 12/09/2021    CO2 29 12/09/2021     12/09/2021     Lab Results   Component Value Date    ALT 30 12/09/2021    AST 17 12/09/2021    ALKPHOS 56 12/09/2021       Lab Results   Component Value Date    WBC 8.98 12/09/2021    HGB 13.8 12/09/2021    HCT 41.9 12/09/2021    MCV 94 12/09/2021     12/09/2021       No results found for: "TSH"    No results found for: "CHOL"  Lab Results   Component Value Date    TRIG 49 05/06/2016     Lab Results   Component Value Date    HDL 59 05/06/2016     Lab Results   Component Value Date    LDLCALC 146 (H) 05/06/2016     No results found for: "HGBA1C"    Results for orders placed or performed in visit on 07/24/23   POCT Measure PVR   Result Value Ref Range    POST-VOID RESIDUAL VOLUME, ML  mL       No orders of the defined types were placed in this encounter. ALLERGIES:  No Known Allergies    Current Medications     Current Outpatient Medications   Medication Sig Dispense Refill   • Calcium Carbonate-Vitamin D3 600-400 MG-UNIT TABS Take     • Coenzyme Q10 (CO Q 10 PO) Take 1 capsule by mouth Daily at 2am     • KRILL OIL PO Take 1,000 mg by mouth     • Multiple Vitamin (MULTIVITAMIN ADULT PO) Take 1 tablet by mouth daily     • tamsulosin (FLOMAX) 0.4 mg Take 1 capsule (0.4 mg total) by mouth daily with dinner 30 capsule 0     No current facility-administered medications for this visit.          Health Maintenance     Health Maintenance   Topic Date Due   • Hepatitis C Screening  Never done   • HIV Screening  Never done   • COVID-19 Vaccine (2 - Booster for Jessenia series) 09/20/2021   • Colorectal Cancer Screening  12/21/2022   • Influenza Vaccine (1) 09/01/2023   • Breast Cancer Screening: Mammogram  02/17/2024   • Depression Screening  03/24/2024   • Annual Physical  03/24/2024   • BMI: Adult  07/27/2024   • DTaP,Tdap,and Td Vaccines (2 - Td or Tdap) 12/09/2031   • Pneumococcal Vaccine: Pediatrics (0 to 5 Years) and At-Risk Patients (6 to 59 Years)  Aged Out   • HIB Vaccine  Aged Out   • IPV Vaccine  Aged Out   • Hepatitis A Vaccine  Aged Out   • Meningococcal ACWY Vaccine  Aged Out   • HPV Vaccine  Aged [employer] History   Administered Date(s) Administered   • COVID-19 J&J (Jessenia) vaccine 0.5 mL 07/26/2021   • Tdap 88/59/4707       Fady Hernandez MD

## 2023-07-27 NOTE — ASSESSMENT & PLAN NOTE
Nephrolithiasis. Patient will continue with Flomax until she obtains her follow-up CT scan of abdomen and pelvis. She is aware to stay well-hydrated with water throughout the day to prevent recurrence of kidney stone. Patient currently is asymptomatic. She does have some hydrocodone tablets leftover should she experience pain once again.

## 2023-07-28 LAB
CALCIUM OXALATE DIHYDRATE MFR STONE IR: 40 %
COLOR STONE: NORMAL
COM MFR STONE: 55 %
COMMENT-STONE3: NORMAL
COMPOSITION: NORMAL
HYDROXYAPATITE 24H ENGDIFF UR: 5 %
LABORATORY COMMENT REPORT: NORMAL
PHOTO: NORMAL
SIZE STONE: NORMAL MM
SPEC SOURCE SUBJ: NORMAL
STONE ANALYSIS-IMP: NORMAL
STONE ANALYSIS-IMP: NORMAL
WT STONE: 6 MG

## 2023-08-08 ENCOUNTER — HOSPITAL ENCOUNTER (OUTPATIENT)
Dept: MAMMOGRAPHY | Facility: MEDICAL CENTER | Age: 63
Discharge: HOME | End: 2023-08-08

## 2023-08-08 VITALS — BODY MASS INDEX: 22.88 KG/M2 | HEIGHT: 68 IN | WEIGHT: 151 LBS

## 2023-08-08 DIAGNOSIS — Z12.31 ENCOUNTER FOR SCREENING MAMMOGRAM FOR MALIGNANT NEOPLASM OF BREAST: ICD-10-CM

## 2023-08-08 PROCEDURE — 77063 BREAST TOMOSYNTHESIS BI: CPT

## 2023-08-08 PROCEDURE — 77067 SCR MAMMO BI INCL CAD: CPT

## 2023-09-05 ENCOUNTER — HOSPITAL ENCOUNTER (OUTPATIENT)
Dept: CT IMAGING | Facility: HOSPITAL | Age: 63
Discharge: HOME | End: 2023-09-05

## 2023-09-05 DIAGNOSIS — N20.1 URETERAL STONE: ICD-10-CM

## 2023-09-05 PROCEDURE — 74176 CT ABD & PELVIS W/O CONTRAST: CPT

## 2023-09-05 PROCEDURE — G1004 CDSM NDSC: HCPCS

## 2023-09-18 ENCOUNTER — OFFICE VISIT (OUTPATIENT)
Dept: UROLOGY | Facility: CLINIC | Age: 63
End: 2023-09-18
Payer: MEDICARE

## 2023-09-18 VITALS
OXYGEN SATURATION: 98 % | HEART RATE: 70 BPM | RESPIRATION RATE: 16 BRPM | WEIGHT: 155.6 LBS | SYSTOLIC BLOOD PRESSURE: 120 MMHG | HEIGHT: 68 IN | DIASTOLIC BLOOD PRESSURE: 78 MMHG | BODY MASS INDEX: 23.58 KG/M2

## 2023-09-18 DIAGNOSIS — N20.0 KIDNEY STONE: Primary | ICD-10-CM

## 2023-09-18 LAB — POST-VOID RESIDUAL VOLUME, ML POC: 0 ML

## 2023-09-18 PROCEDURE — 99213 OFFICE O/P EST LOW 20 MIN: CPT

## 2023-09-18 PROCEDURE — 51798 US URINE CAPACITY MEASURE: CPT

## 2023-09-18 NOTE — PROGRESS NOTES
Office Visit- Urology  Alejandro Parker 1960 MRN: [id number]      Assessment/Discussion/Plan    57 y.o. female managed by     1. Nephrolithiasis  -Repeat CT scan demonstrated passage of right UVJ stone and subsequent resolution of hydronephrosis  -Calcium oxalate stone  -Patient with 3 mm nonobstructing right renal calculus. -Defers metabolic work-up at this time  -Discussed annual imaging for observation versus obtaining imaging as needed  -At this time patient wishes to obtain imaging as needed.   -Patient to follow-up as needed    2. Incomplete bladder emptying  -PVR was 174 ml at initial consultation  -PVR today is 0 mL  -No further work-up is needed unless patient becomes symptomatic  -Patient to follow-up as needed    Chief Complaint:   Mar Chaudhari is a 57 y.o. female presenting to the office for a follow up visit regarding Nephrolithiasis        Subjective  History at time of initial consultation  -78-year-old female  -No significant past urologic history  -A few weeks ago patient was in [facility] and experienced severe right sided flank pain/abdominal pain  -She was evaluated at [facility] on the [facility] side of the [facility] and was found to have a 2 mm obstructing UVJ stone with resultant moderate to severe hydronephrosis  -She was discharged with course of Flomax  -She she has also been drinking plenty of fluids   -She states that this past Saturday she was experiencing symptoms potentially indicative of a urinary tract infection with urgency, frequency, dysuria.  -On Sunday she passed a stone fragment that she was able to  her straining  -Patient has some sensation of incomplete bladder emptying  -She denies any fever, chills, flank pain, or any episodes of gross hematuria    History 9/18/2023  -Patient presents for 4 to 6-week follow-up. She obtained a CT scan which demonstrated resolution of right ureteral stone and hydronephrosis.   -She denies any bothersome urinary symptoms  -Denies dysuria, flank pain, gross hematuria    ROS:   Review of Systems   Constitutional: Negative. Negative for chills, fatigue and fever. HENT: Negative. Respiratory: Negative for shortness of breath. Cardiovascular: Negative for chest pain. Gastrointestinal: Negative. Negative for abdominal pain. Endocrine: Negative. Musculoskeletal: Negative. Skin: Negative. Neurological: Negative. Negative for dizziness and light-headedness. Hematological: Negative. Psychiatric/Behavioral: Negative. Past Medical History  No past medical history on file.     Past Surgical History  Past Surgical History:   Procedure Laterality Date   • HYSTERECTOMY     • US GUIDED THYROID BIOPSY  2/14/2022       Past Family History  Family History   Problem Relation Age of Onset   • Ovarian cancer Sister 27       Past Social history  Social History     Socioeconomic History   • Marital status: /Civil Union     Spouse name: Not on file   • Number of children: Not on file   • Years of education: Not on file   • Highest education level: Not on file   Occupational History   • Not on file   Tobacco Use   • Smoking status: Never   • Smokeless tobacco: Never   Vaping Use   • Vaping Use: Never used   Substance and Sexual Activity   • Alcohol use: No   • Drug use: No   • Sexual activity: Yes     Partners: Male   Other Topics Concern   • Not on file   Social History Narrative   • Not on file     Social Determinants of Health     Financial Resource Strain: Not on file   Food Insecurity: Not on file   Transportation Needs: Not on file   Physical Activity: Not on file   Stress: Not on file   Social Connections: Not on file   Intimate Partner Violence: Not on file   Housing Stability: Not on file       Current Medications  Current Outpatient Medications   Medication Sig Dispense Refill   • Calcium Carbonate-Vitamin D3 600-400 MG-UNIT TABS Take     • Coenzyme Q10 (CO Q 10 PO) Take 1 capsule by mouth Daily at 2am     • KRILL OIL PO Take 1,000 mg by mouth     • Multiple Vitamin (MULTIVITAMIN ADULT PO) Take 1 tablet by mouth daily     • tamsulosin (FLOMAX) 0.4 mg Take 1 capsule (0.4 mg total) by mouth daily with dinner 30 capsule 0     No current facility-administered medications for this visit. Allergies  No Known Allergies    OBJECTIVE    Vitals   Vitals:    09/18/23 0936   Height: 5' 8" (1.727 m)       PVR:    Physical Exam  Constitutional:       General: She is not in acute distress. Appearance: Normal appearance. She is normal weight. She is not ill-appearing or toxic-appearing. HENT:      Head: Normocephalic and atraumatic. Eyes:      Conjunctiva/sclera: Conjunctivae normal.   Cardiovascular:      Rate and Rhythm: Normal rate. Pulmonary:      Effort: Pulmonary effort is normal. No respiratory distress. Abdominal:      Tenderness: There is no right CVA tenderness or left CVA tenderness. Skin:     General: Skin is warm and dry. Neurological:      General: No focal deficit present. Mental Status: She is alert and oriented to person, place, and time. Cranial Nerves: No cranial nerve deficit. Psychiatric:         Mood and Affect: Mood normal.         Behavior: Behavior normal.         Thought Content:  Thought content normal.          Labs:     Lab Results   Component Value Date    CREATININE 0.88 12/09/2021      No results found for: "HGBA1C"  Lab Results   Component Value Date    CALCIUM 8.5 12/09/2021    K 3.5 12/09/2021    CO2 29 12/09/2021     12/09/2021    BUN 17 12/09/2021    CREATININE 0.88 12/09/2021       I have personally reviewed all pertinent lab results and reviewed with patient    Imaging     CT ABDOMEN AND PELVIS WITHOUT IV CONTRAST - LOW DOSE RENAL STONE     INDICATION:   N20.1: Calculus of ureter.        COMPARISON: 12/9/2021     TECHNIQUE:  Low radiation dose thin section CT examination of the abdomen and pelvis was performed without intravenous or oral contrast according to a protocol specifically designed to evaluate for urinary tract calculus. Axial, sagittal, and coronal 2D   reformatted images were created from the source data and submitted for interpretation. Evaluation for pathology in the abdomen and pelvis that is unrelated to urinary tract calculi is limited.     Radiation dose length product (DLP) for this visit:  179 mGy-cm . This examination, like all CT scans performed in the [facility], was performed utilizing techniques to minimize radiation dose exposure, including the use of iterative   reconstruction and automated exposure control.     URINARY TRACT FINDINGS:     RIGHT KIDNEY AND URETER: 3 mm nonobstructing right renal calculus is present. No hydronephrosis or hydroureter.     LEFT KIDNEY AND URETER:  No urinary tract calculi. No hydronephrosis or hydroureter.     URINARY BLADDER:  Unremarkable.        ADDITIONAL FINDINGS:     LOWER CHEST: Linear lower lobe scarring is present.     SOLID VISCERA: Limited low radiation dose noncontrast CT evaluation demonstrates no clinically significant abnormality of the imaged portions of the liver, spleen, pancreas, or adrenal glands.     GALLBLADDER/BILIARY TREE:  No calcified gallstones. No pericholecystic inflammatory change. No biliary dilatation.     STOMACH AND BOWEL:  Unremarkable.     APPENDIX:  No findings to suggest appendicitis.     ABDOMINOPELVIC CAVITY:  No ascites. No pneumoperitoneum. No lymphadenopathy.     VESSELS: Unremarkable for patient's age.     REPRODUCTIVE ORGANS: Surgical changes of prior hysterectomy.     ABDOMINAL WALL/INGUINAL REGIONS:  Unremarkable.     OSSEOUS STRUCTURES:  No acute fracture or destructive osseous lesion.     IMPRESSION:     No evidence of obstructive uropathy. Nonobstructing right renal calculus.     Amarjit Alves PA-C  Date: 9/18/2023 Time: 9:40 AM  [facility]    This note was written using fluency dictation software.  Please excuse any resulting minor grammatical errors.

## 2024-01-25 ENCOUNTER — RA CDI HCC (OUTPATIENT)
Dept: OTHER | Facility: HOSPITAL | Age: 64
End: 2024-01-25

## 2024-01-29 ENCOUNTER — OFFICE VISIT (OUTPATIENT)
Dept: FAMILY MEDICINE CLINIC | Facility: CLINIC | Age: 64
End: 2024-01-29
Payer: MEDICARE

## 2024-01-29 VITALS
WEIGHT: 153.13 LBS | BODY MASS INDEX: 23.21 KG/M2 | RESPIRATION RATE: 18 BRPM | SYSTOLIC BLOOD PRESSURE: 118 MMHG | TEMPERATURE: 97.6 F | HEIGHT: 68 IN | HEART RATE: 75 BPM | OXYGEN SATURATION: 95 % | DIASTOLIC BLOOD PRESSURE: 80 MMHG

## 2024-01-29 DIAGNOSIS — Z00.00 MEDICARE ANNUAL WELLNESS VISIT, SUBSEQUENT: Primary | ICD-10-CM

## 2024-01-29 DIAGNOSIS — E78.5 HYPERLIPIDEMIA, UNSPECIFIED HYPERLIPIDEMIA TYPE: ICD-10-CM

## 2024-01-29 PROCEDURE — 99212 OFFICE O/P EST SF 10 MIN: CPT | Performed by: FAMILY MEDICINE

## 2024-01-29 PROCEDURE — G0402 INITIAL PREVENTIVE EXAM: HCPCS | Performed by: FAMILY MEDICINE

## 2024-01-29 PROCEDURE — 1123F ACP DISCUSS/DSCN MKR DOCD: CPT | Performed by: FAMILY MEDICINE
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
## 2024-01-29 NOTE — PATIENT INSTRUCTIONS
Medicare Preventive Visit Patient Instructions  Thank you for completing your Welcome to Medicare Visit or Medicare Annual Wellness Visit today. Your next wellness visit will be due in one year (1/29/2025).  The screening/preventive services that you may require over the next 5-10 years are detailed below. Some tests may not apply to you based off risk factors and/or age. Screening tests ordered at today's visit but not completed yet may show as past due. Also, please note that scanned in results may not display below.  Preventive Screenings:  Service Recommendations Previous Testing/Comments   Colorectal Cancer Screening  * Colonoscopy    * Fecal Occult Blood Test (FOBT)/Fecal Immunochemical Test (FIT)  * Fecal DNA/Cologuard Test  * Flexible Sigmoidoscopy Age: 45-75 years old   Colonoscopy: every 10 years (may be performed more frequently if at higher risk)  OR  FOBT/FIT: every 1 year  OR  Cologuard: every 3 years  OR  Sigmoidoscopy: every 5 years  Screening may be recommended earlier than age 45 if at higher risk for colorectal cancer. Also, an individualized decision between you and your healthcare provider will decide whether screening between the ages of 76-85 would be appropriate. Colonoscopy: 10/23/2023  FOBT/FIT: Not on file  Cologuard: Not on file  Sigmoidoscopy: 01/01/2012    Screening Current     Breast Cancer Screening Age: 40+ years old  Frequency: every 1-2 years  Not required if history of left and right mastectomy Mammogram: 08/08/2023    Screening Current   Cervical Cancer Screening Between the ages of 21-29, pap smear recommended once every 3 years.   Between the ages of 30-65, can perform pap smear with HPV co-testing every 5 years.   Recommendations may differ for women with a history of total hysterectomy, cervical cancer, or abnormal pap smears in past. Pap Smear: 12/05/2017    Screening Not Indicated   Hepatitis C Screening Once for adults born between 1945 and 1965  More frequently in  patients at high risk for Hepatitis C Hep C Antibody: Not on file        Diabetes Screening 1-2 times per year if you're at risk for diabetes or have pre-diabetes Fasting glucose: No results in last 5 years (No results in last 5 years)  A1C: No results in last 5 years (No results in last 5 years)  Screening Current   Cholesterol Screening Once every 5 years if you don't have a lipid disorder. May order more often based on risk factors. Lipid panel: 03/24/2023    Screening Not Indicated  History Lipid Disorder     Other Preventive Screenings Covered by Medicare:  Abdominal Aortic Aneurysm (AAA) Screening: covered once if your at risk. You're considered to be at risk if you have a family history of AAA.  Lung Cancer Screening: covers low dose CT scan once per year if you meet all of the following conditions: (1) Age 55-77; (2) No signs or symptoms of lung cancer; (3) Current smoker or have quit smoking within the last 15 years; (4) You have a tobacco smoking history of at least 20 pack years (packs per day multiplied by number of years you smoked); (5) You get a written order from a healthcare provider.  Glaucoma Screening: covered annually if you're considered high risk: (1) You have diabetes OR (2) Family history of glaucoma OR (3)  aged 50 and older OR (4)  American aged 65 and older  Osteoporosis Screening: covered every 2 years if you meet one of the following conditions: (1) You're estrogen deficient and at risk for osteoporosis based off medical history and other findings; (2) Have a vertebral abnormality; (3) On glucocorticoid therapy for more than 3 months; (4) Have primary hyperparathyroidism; (5) On osteoporosis medications and need to assess response to drug therapy.   Last bone density test (DXA Scan): Not on file.  HIV Screening: covered annually if you're between the age of 15-65. Also covered annually if you are younger than 15 and older than 65 with risk factors for HIV  infection. For pregnant patients, it is covered up to 3 times per pregnancy.    Immunizations:  Immunization Recommendations   Influenza Vaccine Annual influenza vaccination during flu season is recommended for all persons aged >= 6 months who do not have contraindications   Pneumococcal Vaccine   * Pneumococcal conjugate vaccine = PCV13 (Prevnar 13), PCV15 (Vaxneuvance), PCV20 (Prevnar 20)  * Pneumococcal polysaccharide vaccine = PPSV23 (Pneumovax) Adults 19-63 yo with certain risk factors or if 65+ yo  If never received any pneumonia vaccine: recommend Prevnar 20 (PCV20)  Give PCV20 if previously received 1 dose of PCV13 or PPSV23   Hepatitis B Vaccine 3 dose series if at intermediate or high risk (ex: diabetes, end stage renal disease, liver disease)   Respiratory syncytial virus (RSV) Vaccine - COVERED BY MEDICARE PART D  * RSVPreF3 (Arexvy) CDC recommends that adults 60 years of age and older may receive a single dose of RSV vaccine using shared clinical decision-making (SCDM)   Tetanus (Td) Vaccine - COST NOT COVERED BY MEDICARE PART B Following completion of primary series, a booster dose should be given every 10 years to maintain immunity against tetanus. Td may also be given as tetanus wound prophylaxis.   Tdap Vaccine - COST NOT COVERED BY MEDICARE PART B Recommended at least once for all adults. For pregnant patients, recommended with each pregnancy.   Shingles Vaccine (Shingrix) - COST NOT COVERED BY MEDICARE PART B  2 shot series recommended in those 19 years and older who have or will have weakened immune systems or those 50 years and older     Health Maintenance Due:      Topic Date Due   • Hepatitis C Screening  Never done   • HIV Screening  Never done   • Breast Cancer Screening: Mammogram  08/08/2025   • Colorectal Cancer Screening  10/23/2033     Immunizations Due:      Topic Date Due   • COVID-19 Vaccine (2 - 2023-24 season) 09/01/2023   • Pneumococcal Vaccine: 65+ Years (1 - PCV) Never done      Advance Directives   What are advance directives?  Advance directives are legal documents that state your wishes and plans for medical care. These plans are made ahead of time in case you lose your ability to make decisions for yourself. Advance directives can apply to any medical decision, such as the treatments you want, and if you want to donate organs.   What are the types of advance directives?  There are many types of advance directives, and each state has rules about how to use them. You may choose a combination of any of the following:  Living will:  This is a written record of the treatment you want. You can also choose which treatments you do not want, which to limit, and which to stop at a certain time. This includes surgery, medicine, IV fluid, and tube feedings.   Durable power of  for healthcare (DPAHC):  This is a written record that states who you want to make healthcare choices for you when you are unable to make them for yourself. This person, called a proxy, is usually a family member or a friend. You may choose more than 1 proxy.  Do not resuscitate (DNR) order:  A DNR order is used in case your heart stops beating or you stop breathing. It is a request not to have certain forms of treatment, such as CPR. A DNR order may be included in other types of advance directives.  Medical directive:  This covers the care that you want if you are in a coma, near death, or unable to make decisions for yourself. You can list the treatments you want for each condition. Treatment may include pain medicine, surgery, blood transfusions, dialysis, IV or tube feedings, and a ventilator (breathing machine).  Values history:  This document has questions about your views, beliefs, and how you feel and think about life. This information can help others choose the care that you would choose.  Why are advance directives important?  An advance directive helps you control your care. Although spoken wishes may  be used, it is better to have your wishes written down. Spoken wishes can be misunderstood, or not followed. Treatments may be given even if you do not want them. An advance directive may make it easier for your family to make difficult choices about your care.       © Copyright [employer] 2018 Information is for End User's use only and may not be sold, redistributed or otherwise used for commercial purposes. All illustrations and images included in CareNotes® are the copyrighted property of [employer].D[employer] or [employer]

## 2024-01-29 NOTE — ASSESSMENT & PLAN NOTE
Hyperlipidemia.  Patient will check lipid panel blood work and continue with over-the-counter products for cholesterol.  We will make further recommendations pending results of test.

## 2024-02-01 ENCOUNTER — APPOINTMENT (OUTPATIENT)
Dept: LAB | Facility: CLINIC | Age: 64
End: 2024-02-01
Payer: MEDICARE

## 2024-02-01 DIAGNOSIS — Z00.00 MEDICARE ANNUAL WELLNESS VISIT, SUBSEQUENT: ICD-10-CM

## 2024-02-01 DIAGNOSIS — E04.1 THYROID NODULE: ICD-10-CM

## 2024-02-01 DIAGNOSIS — E78.5 HYPERLIPIDEMIA, UNSPECIFIED HYPERLIPIDEMIA TYPE: ICD-10-CM

## 2024-02-01 DIAGNOSIS — Z00.00 PERIODIC HEALTH ASSESSMENT, GENERAL SCREENING, ADULT: ICD-10-CM

## 2024-02-01 LAB
ALBUMIN SERPL BCP-MCNC: 4.1 G/DL (ref 3.5–5)
ALP SERPL-CCNC: 49 U/L (ref 34–104)
ALT SERPL W P-5'-P-CCNC: 16 U/L (ref 7–52)
ANION GAP SERPL CALCULATED.3IONS-SCNC: 9 MMOL/L
AST SERPL W P-5'-P-CCNC: 19 U/L (ref 13–39)
BILIRUB SERPL-MCNC: 0.93 MG/DL (ref 0.2–1)
BUN SERPL-MCNC: 16 MG/DL (ref 5–25)
CALCIUM SERPL-MCNC: 9 MG/DL (ref 8.4–10.2)
CHLORIDE SERPL-SCNC: 103 MMOL/L (ref 96–108)
CHOLEST SERPL-MCNC: 234 MG/DL
CO2 SERPL-SCNC: 28 MMOL/L (ref 21–32)
CREAT SERPL-MCNC: 0.76 MG/DL (ref 0.6–1.3)
ERYTHROCYTE [DISTWIDTH] IN BLOOD BY AUTOMATED COUNT: 12.2 % (ref 11.6–15.1)
GFR SERPL CREATININE-BSD FRML MDRD: 82 ML/MIN/1.73SQ M
GLUCOSE P FAST SERPL-MCNC: 91 MG/DL (ref 65–99)
HCT VFR BLD AUTO: 43.3 % (ref 34.8–46.1)
HDLC SERPL-MCNC: 47 MG/DL
HGB BLD-MCNC: 13.9 G/DL (ref 11.5–15.4)
LDLC SERPL CALC-MCNC: 169 MG/DL (ref 0–100)
MCH RBC QN AUTO: 30.3 PG (ref 26.8–34.3)
MCHC RBC AUTO-ENTMCNC: 32.1 G/DL (ref 31.4–37.4)
MCV RBC AUTO: 94 FL (ref 82–98)
NONHDLC SERPL-MCNC: 187 MG/DL
PLATELET # BLD AUTO: 307 THOUSANDS/UL (ref 149–390)
PMV BLD AUTO: 10.8 FL (ref 8.9–12.7)
POTASSIUM SERPL-SCNC: 4.4 MMOL/L (ref 3.5–5.3)
PROT SERPL-MCNC: 6.4 G/DL (ref 6.4–8.4)
RBC # BLD AUTO: 4.59 MILLION/UL (ref 3.81–5.12)
SODIUM SERPL-SCNC: 140 MMOL/L (ref 135–147)
TRIGL SERPL-MCNC: 90 MG/DL
TSH SERPL DL<=0.05 MIU/L-ACNC: 1.92 UIU/ML (ref 0.45–4.5)
WBC # BLD AUTO: 7.26 THOUSAND/UL (ref 4.31–10.16)

## 2024-02-01 PROCEDURE — 84443 ASSAY THYROID STIM HORMONE: CPT

## 2024-02-01 PROCEDURE — 36415 COLL VENOUS BLD VENIPUNCTURE: CPT

## 2024-02-01 PROCEDURE — 85027 COMPLETE CBC AUTOMATED: CPT

## 2024-02-01 PROCEDURE — 80053 COMPREHEN METABOLIC PANEL: CPT

## 2024-02-01 PROCEDURE — 80061 LIPID PANEL: CPT

## 2024-02-02 ENCOUNTER — TELEPHONE (OUTPATIENT)
Dept: FAMILY MEDICINE CLINIC | Facility: CLINIC | Age: 64
End: 2024-02-02

## 2024-02-02 NOTE — TELEPHONE ENCOUNTER
----- Message from Rod Marvin MD sent at 2/2/2024  6:15 AM EST -----  Recent blood work stable for patient with the exception of elevation in cholesterol at 234.  This is still above goal of 200 despite her taking co-Q10 and fish oil tablets.  Please mail low-cholesterol diet sheet to follow more closely.  Repeat blood work in 6 months.  If she is unable to lower this value I would recommend starting low-dose statin therapy once daily.

## 2024-02-21 PROBLEM — Z00.00 MEDICARE ANNUAL WELLNESS VISIT, SUBSEQUENT: Status: RESOLVED | Noted: 2024-01-29 | Resolved: 2024-02-21

## 2024-11-12 ENCOUNTER — TELEPHONE (OUTPATIENT)
Age: 64
End: 2024-11-12

## 2024-11-12 DIAGNOSIS — E78.5 HYPERLIPIDEMIA, UNSPECIFIED HYPERLIPIDEMIA TYPE: Primary | ICD-10-CM

## 2024-11-12 NOTE — TELEPHONE ENCOUNTER
New order for lipid panel placed in [facility].  Patient may go to any [facility]'s laboratory after 12 hours of fasting to obtain blood work

## 2024-11-19 ENCOUNTER — APPOINTMENT (OUTPATIENT)
Dept: LAB | Facility: CLINIC | Age: 64
End: 2024-11-19
Payer: MEDICARE

## 2024-11-19 DIAGNOSIS — E78.5 HYPERLIPIDEMIA, UNSPECIFIED HYPERLIPIDEMIA TYPE: ICD-10-CM

## 2024-11-19 LAB
CHOLEST SERPL-MCNC: 231 MG/DL (ref ?–200)
HDLC SERPL-MCNC: 44 MG/DL
LDLC SERPL CALC-MCNC: 165 MG/DL (ref 0–100)
NONHDLC SERPL-MCNC: 187 MG/DL
TRIGL SERPL-MCNC: 109 MG/DL (ref ?–150)

## 2024-11-19 PROCEDURE — 36415 COLL VENOUS BLD VENIPUNCTURE: CPT

## 2024-11-19 PROCEDURE — 80061 LIPID PANEL: CPT

## 2024-11-20 ENCOUNTER — RESULTS FOLLOW-UP (OUTPATIENT)
Dept: FAMILY MEDICINE CLINIC | Facility: CLINIC | Age: 64
End: 2024-11-20

## 2024-11-20 NOTE — TELEPHONE ENCOUNTER
----- Message from Rod Marvin MD sent at 11/20/2024  5:52 AM EST -----  Recent cholesterol test was essentially unchanged at 231.  If patient changes her mind I would recommend low-dose statin therapy.

## 2025-02-05 ENCOUNTER — HOSPITAL ENCOUNTER (OUTPATIENT)
Dept: RADIOLOGY | Age: 65
Discharge: HOME | End: 2025-02-05
Payer: MEDICARE

## 2025-02-05 VITALS — WEIGHT: 153 LBS | HEIGHT: 68 IN | BODY MASS INDEX: 23.19 KG/M2

## 2025-02-05 DIAGNOSIS — Z12.31 BREAST CANCER SCREENING BY MAMMOGRAM: ICD-10-CM

## 2025-02-05 DIAGNOSIS — Z00.00 PERIODIC HEALTH ASSESSMENT, GENERAL SCREENING, ADULT: ICD-10-CM

## 2025-02-05 PROCEDURE — 77080 DXA BONE DENSITY AXIAL: CPT

## 2025-02-05 PROCEDURE — 77067 SCR MAMMO BI INCL CAD: CPT

## 2025-02-05 PROCEDURE — 77063 BREAST TOMOSYNTHESIS BI: CPT

## 2025-02-14 ENCOUNTER — RESULTS FOLLOW-UP (OUTPATIENT)
Dept: FAMILY MEDICINE CLINIC | Facility: CLINIC | Age: 65
End: 2025-02-14

## 2025-07-21 ENCOUNTER — OFFICE VISIT (OUTPATIENT)
Dept: FAMILY MEDICINE CLINIC | Facility: CLINIC | Age: 65
End: 2025-07-21
Payer: MEDICARE

## 2025-07-21 VITALS
WEIGHT: 156.2 LBS | BODY MASS INDEX: 23.67 KG/M2 | OXYGEN SATURATION: 92 % | TEMPERATURE: 98 F | HEIGHT: 68 IN | SYSTOLIC BLOOD PRESSURE: 106 MMHG | RESPIRATION RATE: 16 BRPM | HEART RATE: 66 BPM | DIASTOLIC BLOOD PRESSURE: 80 MMHG

## 2025-07-21 DIAGNOSIS — E78.49 OTHER HYPERLIPIDEMIA: Primary | ICD-10-CM

## 2025-07-21 DIAGNOSIS — Z00.00 MEDICARE ANNUAL WELLNESS VISIT, SUBSEQUENT: ICD-10-CM

## 2025-07-21 LAB
ALBUMIN SERPL-MCNC: 3.9 G/DL (ref 3.5–5.7)
ALP SERPL-CCNC: 48 U/L (ref 35–120)
ALT SERPL-CCNC: 29 U/L
ANION GAP SERPL CALCULATED.3IONS-SCNC: 8 MMOL/L (ref 3–11)
AST SERPL-CCNC: 21 U/L
BILIRUB SERPL-MCNC: 0.5 MG/DL (ref 0.2–1)
BUN SERPL-MCNC: 15 MG/DL (ref 7–25)
CALCIUM SERPL-MCNC: 9 MG/DL (ref 8.5–10.5)
CHLORIDE SERPL-SCNC: 106 MMOL/L (ref 100–109)
CHOLEST SERPL-MCNC: 242 MG/DL
CHOLEST/HDLC SERPL: 5.6 {RATIO}
CO2 SERPL-SCNC: 29 MMOL/L (ref 21–31)
CREAT SERPL-MCNC: 0.71 MG/DL (ref 0.4–1.1)
CYTOLOGY CMNT CVX/VAG CYTO-IMP: ABNORMAL
GFR/BSA.PRED SERPLBLD CYS-BASED-ARV: 93 ML/MIN/{1.73_M2}
GLUCOSE SERPL-MCNC: 96 MG/DL (ref 65–99)
HDLC SERPL-MCNC: 43 MG/DL (ref 23–92)
LDLC SERPL CALC-MCNC: 167 MG/DL
NONHDLC SERPL-MCNC: 199 MG/DL
POTASSIUM SERPL-SCNC: 4.3 MMOL/L (ref 3.5–5.2)
PROT SERPL-MCNC: 6.1 G/DL (ref 6.3–8.3)
SODIUM SERPL-SCNC: 143 MMOL/L (ref 135–145)
TRIGL SERPL-MCNC: 162 MG/DL

## 2025-07-21 PROCEDURE — G0439 PPPS, SUBSEQ VISIT: HCPCS | Performed by: FAMILY MEDICINE

## 2025-07-21 NOTE — PROGRESS NOTES
Name: Lidia Geiger      : 1960      MRN: [id number]  Encounter Provider: Rod Marvin MD  Encounter Date: 2025   Encounter department: [facility], [place]  :  Assessment & Plan  Other hyperlipidemia    Orders:    Comprehensive metabolic panel; Future    Lipid panel; Future    Medicare annual wellness visit, subsequent  Medicare annual wellness visit.  Overall the patient appears to be in stable health.  She will obtain blood work as ordered for further evaluation.  We will make further recommendations pending results of test.            Preventive health issues were discussed with patient, and age appropriate screening tests were ordered as noted in patient's After Visit Summary. Personalized health advice and appropriate referrals for health education or preventive services given if needed, as noted in patient's After Visit Summary.    History of Present Illness     Patient states she she has not had any recent illness.  She stays active with walking, landscaping, waterskiing.  She denies any changes in weight over the past year.  Her colonoscopy is up-to-date as is her mammogram.       Patient Care Team:  Rod Marvin MD as PCP - General    Review of Systems   Constitutional: Negative.    HENT: Negative.     Eyes: Negative.    Respiratory: Negative.     Cardiovascular: Negative.    Gastrointestinal: Negative.    Genitourinary: Negative.    Musculoskeletal: Negative.    Neurological: Negative.    Psychiatric/Behavioral: Negative.       Medical History Reviewed by provider this encounter:  [facility]       Annual Wellness Visit Questionnaire   Lidia is here for her Subsequent Wellness visit. Last Medicare Wellness visit information reviewed, patient interviewed and updates made to the record today.      Health Risk Assessment:   Patient rates overall health as very good. Patient feels that their physical health rating is same. Patient is very satisfied with their life. Eyesight was rated as  same. Hearing was rated as same. Patient feels that their emotional and mental health rating is same. Patients states they are never, rarely angry. Patient states they are never, rarely unusually tired/fatigued. Pain experienced in the last 7 days has been none. Patient states that she has experienced no weight loss or gain in last 6 months.     Depression Screening:   PHQ-2 Score: 0      Fall Risk Screening:   In the past year, patient has experienced: no history of falling in past year      Urinary Incontinence Screening:   Patient has not leaked urine accidently in the last six months.     Home Safety:  Patient does not have trouble with stairs inside or outside of their home. Patient has working smoke alarms and has working carbon monoxide detector. Home safety hazards include: none.     Nutrition:   Current diet is Regular.     Medications:   Patient is currently taking over-the-counter supplements. OTC medications include: see medication list. Patient is able to manage medications.     Activities of Daily Living (ADLs)/Instrumental Activities of Daily Living (IADLs):   Walk and transfer into and out of bed and chair?: Yes  Dress and groom yourself?: Yes    Bathe or shower yourself?: Yes    Feed yourself? Yes  Do your laundry/housekeeping?: Yes  Manage your money, pay your bills and track your expenses?: Yes  Make your own meals?: Yes    Do your own shopping?: Yes    Previous Hospitalizations:   Any hospitalizations or ED visits within the last 12 months?: No      Advance Care Planning:   Living will: Yes    Durable POA for healthcare: Yes    Advanced directive: Yes      Preventive Screenings      Cardiovascular Screening:    General: Screening Not Indicated, History Lipid Disorder and Risks and Benefits Discussed    Due for: Lipid Panel      Diabetes Screening:     General: Risks and Benefits Discussed    Due for: Blood Glucose      Colorectal Cancer Screening:     General: Screening Current      Breast Cancer  Screening:     General: Screening Current      Cervical Cancer Screening:    General: Screening Not Indicated      Lung Cancer Screening:     General: Screening Not Indicated    Immunizations:  - Immunizations due: Prevnar 20 and Zoster (Shingrix)    Screening, Brief Intervention, and Referral to Treatment (SBIRT)     Screening  Typical number of drinks in a day: 0  Typical number of drinks in a week: 0  Interpretation: Low risk drinking behavior.    AUDIT-C Screenin) How often did you have a drink containing alcohol in the past year? monthly or less  2) How many drinks did you have on a typical day when you were drinking in the past year? 1 to 2  3) How often did you have 6 or more drinks on one occasion in the past year? never    AUDIT-C Score: 1  Interpretation: Score 0-2 (female): Negative screen for alcohol misuse    Single Item Drug Screening:  How often have you used an illegal drug (including marijuana) or a prescription medication for non-medical reasons in the past year? never    Single Item Drug Screen Score: 0  Interpretation: Negative screen for possible drug use disorder    Social Drivers of Health     Financial Resource Strain: Low Risk  (2024)    Overall Financial Resource Strain (CARDIA)     Difficulty of Paying Living Expenses: Not hard at all   Food Insecurity: No Food Insecurity (2025)    Nursing - Inadequate Food Risk Classification     Worried About Running Out of Food in the Last Year: Never true     Ran Out of Food in the Last Year: Never true   Transportation Needs: No Transportation Needs (2025)    PRAPARE - Transportation     Lack of Transportation (Medical): No     Lack of Transportation (Non-Medical): No   Housing Stability: Low Risk  (2025)    Housing Stability Vital Sign     Unable to Pay for Housing in the Last Year: No     Number of Times Moved in the Last Year: 0     Homeless in the Last Year: No   Utilities: Not At Risk (2025)    [facility] Utilities      "Threatened with loss of utilities: No     No results found.    Objective   /80 (BP Location: Right arm, Patient Position: Sitting, Cuff Size: Standard)   Pulse 66   Temp 98 °F (36.7 °C) (Temporal)   Resp 16   Ht 5' 8\" (1.727 m)   Wt 70.9 kg (156 lb 3.2 oz)   SpO2 92%   BMI 23.75 kg/m²     Physical Exam  Vitals and nursing note reviewed.   Constitutional:       General: She is not in acute distress.     Appearance: Normal appearance. She is well-developed. She is not ill-appearing.   HENT:      Head: Normocephalic and atraumatic.     Eyes:      General:         Right eye: No discharge.         Left eye: No discharge.      Extraocular Movements: Extraocular movements intact.      Conjunctiva/sclera: Conjunctivae normal.      Pupils: Pupils are equal, round, and reactive to light.     Neck:      Vascular: No carotid bruit.     Cardiovascular:      Rate and Rhythm: Normal rate and regular rhythm.      Heart sounds: No murmur heard.  Pulmonary:      Effort: Pulmonary effort is normal. No respiratory distress.      Breath sounds: Normal breath sounds.   Abdominal:      General: Abdomen is flat. Bowel sounds are normal.      Palpations: Abdomen is soft.      Tenderness: There is no abdominal tenderness. There is no guarding or rebound.     Musculoskeletal:      Right lower leg: No edema.      Left lower leg: No edema.   Lymphadenopathy:      Cervical: No cervical adenopathy.     Skin:     General: Skin is warm and dry.      Capillary Refill: Capillary refill takes less than 2 seconds.     Neurological:      Mental Status: She is alert. Mental status is at baseline.     Psychiatric:         Mood and Affect: Mood normal.         Behavior: Behavior normal.         Thought Content: Thought content normal.         Judgment: Judgment normal.         "

## 2025-07-21 NOTE — ASSESSMENT & PLAN NOTE
----- Message from CONSTANCE Glass sent at 4/3/2023  1:33 PM CDT -----  Nohelia Servin,    I was recently seeing Vishnu in the PHP program; he was discharged on Friday 3/31/23 as his insurance would not cover any of our providers.    I received some labs that are potentially concerning:    Vitamin D = 9.1  TSH = 0.496    I can send a prescription for vitamin D supplement (50,000 units weekly x 12 weeks), but don't usually treat thyroid issues.  If it is actually hyperthyroidism it could definitely be having an impact on his mental health (depression, anxiety).  I called a left him a message to call me back, but thought I should update you as well, so your office could follow up with him    Please let me know if you would like me to send in the vitamin D prescription.    Best regards,    Debbie Owens, MSN, [facility], PMHNP-BC  04/03/23       Medicare annual wellness visit.  Overall the patient appears to be in stable health.  She will obtain blood work as ordered for further evaluation.  We will make further recommendations pending results of test.